# Patient Record
Sex: FEMALE | Race: ASIAN | NOT HISPANIC OR LATINO | Employment: FULL TIME | URBAN - METROPOLITAN AREA
[De-identification: names, ages, dates, MRNs, and addresses within clinical notes are randomized per-mention and may not be internally consistent; named-entity substitution may affect disease eponyms.]

---

## 2021-07-30 ENCOUNTER — HOSPITAL ENCOUNTER (EMERGENCY)
Facility: CLINIC | Age: 31
Discharge: HOME | End: 2021-07-31
Attending: EMERGENCY MEDICINE | Admitting: EMERGENCY MEDICINE
Payer: COMMERCIAL

## 2021-07-30 DIAGNOSIS — O46.90 VAGINAL BLEEDING IN PREGNANCY: ICD-10-CM

## 2021-07-30 LAB
ABO/RH(D): NORMAL
ALBUMIN UR-MCNC: NEGATIVE MG/DL
ANION GAP SERPL CALCULATED.3IONS-SCNC: 3 MMOL/L (ref 3–14)
ANTIBODY SCREEN: NEGATIVE
APPEARANCE UR: CLEAR
B-HCG SERPL-ACNC: 2 IU/L (ref 0–5)
BACTERIA #/AREA URNS HPF: ABNORMAL /HPF
BASOPHILS # BLD AUTO: 0.1 10E3/UL (ref 0–0.2)
BASOPHILS NFR BLD AUTO: 1 %
BILIRUB UR QL STRIP: NEGATIVE
BUN SERPL-MCNC: 9 MG/DL (ref 7–30)
CALCIUM SERPL-MCNC: 9 MG/DL (ref 8.5–10.1)
CHLORIDE BLD-SCNC: 109 MMOL/L (ref 94–109)
CO2 SERPL-SCNC: 28 MMOL/L (ref 20–32)
COLOR UR AUTO: YELLOW
CREAT SERPL-MCNC: 0.68 MG/DL (ref 0.52–1.04)
EOSINOPHIL # BLD AUTO: 0.2 10E3/UL (ref 0–0.7)
EOSINOPHIL NFR BLD AUTO: 2 %
ERYTHROCYTE [DISTWIDTH] IN BLOOD BY AUTOMATED COUNT: 12.4 % (ref 10–15)
GFR SERPL CREATININE-BSD FRML MDRD: >90 ML/MIN/1.73M2
GLUCOSE BLD-MCNC: 103 MG/DL (ref 70–99)
GLUCOSE UR STRIP-MCNC: NEGATIVE MG/DL
HCT VFR BLD AUTO: 40.4 % (ref 35–47)
HGB BLD-MCNC: 13.4 G/DL (ref 11.7–15.7)
HGB UR QL STRIP: ABNORMAL
HOLD SPECIMEN: NORMAL
IMM GRANULOCYTES # BLD: 0 10E3/UL
IMM GRANULOCYTES NFR BLD: 0 %
KETONES UR STRIP-MCNC: NEGATIVE MG/DL
LEUKOCYTE ESTERASE UR QL STRIP: ABNORMAL
LYMPHOCYTES # BLD AUTO: 2.3 10E3/UL (ref 0.8–5.3)
LYMPHOCYTES NFR BLD AUTO: 23 %
MCH RBC QN AUTO: 30.8 PG (ref 26.5–33)
MCHC RBC AUTO-ENTMCNC: 33.2 G/DL (ref 31.5–36.5)
MCV RBC AUTO: 93 FL (ref 78–100)
MONOCYTES # BLD AUTO: 1 10E3/UL (ref 0–1.3)
MONOCYTES NFR BLD AUTO: 10 %
MUCOUS THREADS #/AREA URNS LPF: PRESENT /LPF
NEUTROPHILS # BLD AUTO: 6.5 10E3/UL (ref 1.6–8.3)
NEUTROPHILS NFR BLD AUTO: 64 %
NITRATE UR QL: NEGATIVE
NRBC # BLD AUTO: 0 10E3/UL
NRBC BLD AUTO-RTO: 0 /100
PH UR STRIP: 6 [PH] (ref 5–7)
PLATELET # BLD AUTO: 300 10E3/UL (ref 150–450)
POTASSIUM BLD-SCNC: 4 MMOL/L (ref 3.4–5.3)
RBC # BLD AUTO: 4.35 10E6/UL (ref 3.8–5.2)
RBC URINE: 92 /HPF
SODIUM SERPL-SCNC: 140 MMOL/L (ref 133–144)
SP GR UR STRIP: 1.02 (ref 1–1.03)
SPECIMEN EXPIRATION DATE: NORMAL
SQUAMOUS EPITHELIAL: 1 /HPF
UROBILINOGEN UR STRIP-MCNC: NORMAL MG/DL
WBC # BLD AUTO: 10.2 10E3/UL (ref 4–11)
WBC URINE: 7 /HPF

## 2021-07-30 PROCEDURE — 84702 CHORIONIC GONADOTROPIN TEST: CPT | Performed by: EMERGENCY MEDICINE

## 2021-07-30 PROCEDURE — 81001 URINALYSIS AUTO W/SCOPE: CPT | Performed by: EMERGENCY MEDICINE

## 2021-07-30 PROCEDURE — 80048 BASIC METABOLIC PNL TOTAL CA: CPT | Performed by: EMERGENCY MEDICINE

## 2021-07-30 PROCEDURE — 85025 COMPLETE CBC W/AUTO DIFF WBC: CPT | Performed by: EMERGENCY MEDICINE

## 2021-07-30 PROCEDURE — 86900 BLOOD TYPING SEROLOGIC ABO: CPT | Performed by: EMERGENCY MEDICINE

## 2021-07-30 PROCEDURE — 36415 COLL VENOUS BLD VENIPUNCTURE: CPT | Performed by: EMERGENCY MEDICINE

## 2021-07-30 PROCEDURE — 85004 AUTOMATED DIFF WBC COUNT: CPT | Performed by: EMERGENCY MEDICINE

## 2021-07-30 PROCEDURE — 99284 EMERGENCY DEPT VISIT MOD MDM: CPT | Mod: 25

## 2021-07-31 ENCOUNTER — APPOINTMENT (OUTPATIENT)
Dept: ULTRASOUND IMAGING | Facility: CLINIC | Age: 31
End: 2021-07-31
Attending: EMERGENCY MEDICINE
Payer: COMMERCIAL

## 2021-07-31 VITALS
OXYGEN SATURATION: 100 % | SYSTOLIC BLOOD PRESSURE: 106 MMHG | RESPIRATION RATE: 18 BRPM | HEART RATE: 78 BPM | TEMPERATURE: 97.4 F | DIASTOLIC BLOOD PRESSURE: 66 MMHG

## 2021-07-31 PROCEDURE — 76801 OB US < 14 WKS SINGLE FETUS: CPT

## 2021-07-31 ASSESSMENT — ENCOUNTER SYMPTOMS
CHILLS: 0
ABDOMINAL PAIN: 1
HEMATURIA: 0
BACK PAIN: 1
DYSURIA: 0
FEVER: 0

## 2021-07-31 NOTE — ED PROVIDER NOTES
History   Chief Complaint:  Vaginal Bleeding       HPI   Ruth Persaud is a 31 year old female who presents with vaginal bleeding in pregnancy. The patient states that she is about 6 weeks pregnant identified via home test and clinic blood test earlier this week. She states that her HCG value earlier this week was reported to be 19. This morning, she began to feel dizzy before she began to have vaginal bleeding around 1300 this afternoon. The bleeding has continually gotten heavier and she states she has now soaked at least one pad. She denies any noticeable clots passing. The patient also reports lower abdominal cramping beginning shortly after the bleeding began, as well as lower abdominal pain. She denies dysuria, hematuria, nausea, vomiting, fever, and chills. Her LMP was . The patient has had two miscarriages, one , and two pregnancies carried to term.    Review of Systems   Constitutional: Negative for chills and fever.   Gastrointestinal: Positive for abdominal pain (cramping).   Genitourinary: Positive for vaginal bleeding. Negative for dysuria and hematuria.   Musculoskeletal: Positive for back pain.   All other systems reviewed and are negative.      Allergies:  The patient has no known allergies.     Medications:  The patient is not currently taking any prescribed medications.     Past Medical History:    LGSIL on cytologic smear of cervix  Migraine without aura  Anxiety  Depression  Varicella zoster     Past Surgical History:    [place] teeth extraction      Family History:    Type II diabetes - father  Depression - mother    Social History:  The patient presents to the emergency department with her partner, Ari.  PCP: Clinic, [facility] [place] (Inactive)     Physical Exam     Patient Vitals for the past 24 hrs:   BP Temp Temp src Pulse Resp SpO2   21 -- -- -- -- -- 100 %   21 -- -- -- -- -- 99 %   21 -- -- -- -- -- 100 %   21 -- -- -- -- --  100 %   07/31/21 0125 -- -- -- -- -- 100 %   07/31/21 0120 -- -- -- -- -- 100 %   07/31/21 0115 -- -- -- -- -- 100 %   07/31/21 0100 106/66 -- -- 78 -- 100 %   07/31/21 0050 117/70 -- -- 70 -- 100 %   07/30/21 2025 121/72 97.4  F (36.3  C) Temporal 76 18 100 %       Physical Exam  Constitutional: Alert, attentive  HENT:    Nose: Nose normal.    Mouth/Throat: Oropharynx is clear, mucous membranes are moist   Eyes: EOM are normal.   CV: regular rate and rhythm; no murmurs, rubs or gallups  Chest: Effort normal and breath sounds normal.   GI:  There is no tenderness. No distension. Normal bowel sounds  MSK: Normal range of motion.   Neurological: Alert, attentive  Skin: Skin is warm and dry.      Emergency Department Course   Imaging:  OB  US 1st trimester w transvag  IMPRESSION:   There is no evidence of pregnancy. Ectopic pregnancy is not ruled out.  As read by Radiology.     Laboratory:  CBC: WBC: 10.2, HGB: 13.4, PLT: 300    BMP: Glucose 103 (H), o/w WNL (Creatinine: 0.68)    UA: Blood: large (A), Leukocyte Esterase: small (A), WBC: 7 (H), RBC: 92 (H), Bacteria: few (A), Mucous: Present, o/w Negative    HCG Quantitative Blood: 2    ABO/Rh Type and Screen: AB positive, antibody negative    Emergency Department Course:  Reviewed:  I reviewed the patient's nursing notes, vitals, past medical records, and Care Everywhere.     Assessments:  0106 I performed an exam of the patient and obtained history, as documented above.     0240 I rechecked the patient and discussed the workup results. She is comfortable with discharge to home at this time.    Disposition:  The patient was discharged to home.     Impression & Plan   Medical Decision Making:  This is a 31-year-old female with history of of miscarriage who presents for evaluation spotting and mild bleeding in the context of recent positive pregnancy test.  Based on her LMP, she should be approximately 6 weeks pregnant.  However, hCG quantitative earlier this week was  19.  She has a benign abdominal exam and no significant pain reported.  hCG today is 2, concerning for evolving miscarriage.  Ultrasound shows no intrauterine pregnancy or evidence of ectopic.  Given time course, suspect miscarriage.  Discussed this with the patient and her significant other at the bedside.  Plan OB/GYN follow-up in 2 to 3 days return precautions for pain, bleeding, or any other concerns.    Diagnosis:    ICD-10-CM    1. Vaginal bleeding in pregnancy  O46.90        Discharge Medications:  New Prescriptions    No medications on file       Scribe Disclosure:  Aurora MIDDLETON, am serving as a scribe at 12:41 AM on 7/31/2021 to document services personally performed by Godwin Beasley MD based on my observations and the provider's statements to me.     July 31, 2021   [facility] Emergency Department     Godwin Beasley MD  07/31/21 0544

## 2021-07-31 NOTE — ED TRIAGE NOTES
Pt arrives with c/o vaginal bleeding and cramping that started today. Pt is 6 weeks pregnant. Pt reports she noticed some discharge around 1300 today and then bleeding and cramping started around 1500. Pt has changed 2 pads since 1500. Pt does endorse some dizziness. ABCs intact.

## 2021-07-31 NOTE — DISCHARGE INSTRUCTIONS
Discharge Instructions  Vaginal Bleeding in Pregnancy    Bleeding in early pregnancy can be a sign of a miscarriage or an abnormal pregnancy We may do blood pregnancy tests and ultrasound to try to determine what is causing the bleeding, but sometimes we are unable to tell. If this is the case, it will often require more time, more blood tests, and another ultrasound. In this case, we have concern that you are having a miscarriage due to your ultrasound findings and lower value of your pregnancy test.    Generally, every Emergency Department visit should have a follow-up clinic visit with either a primary or a specialty clinic/provider. Please follow-up as instructed by your emergency provider today.    Return to the Emergency Department if:  You have severe abdominal (belly) or pelvic pain.  You faint, or feel lightheaded or dizzy.   Your bleeding gets much worse.  You pass any tissue--solid material that does not look like a blood clot. If you pass tissue, save it (even if you have to pull it out of the toilet) and put it in a plastic bag or jar and bring it in.    You have a fever of 100.5 F or higher.  If no pregnancy could be seen:  It may be that everything is normal and it is just too early to see the pregnancy. It is also possible that you could have an ectopic pregnancy, which is a pregnancy in an abnormal location, such as in the tube ( tubal pregnancy ). We don t see evidence that this is likely today but we cannot exclude it with certainty until a pregnancy can be seen in the uterus (womb) and an ectopic pregnancy can cause severe internal bleeding or death so follow-up is very important.  You should not be alone, in case you suddenly become very sick.   You should not have sex or put anything in your vagina.     If a pregnancy was seen in your uterus:  If a heartbeat could be seen, the chance of miscarriage is lower but because you had bleeding the chance of a miscarriage still exists.  You should not  have sex or put anything in your vagina.  Follow-up as directed with your OB/GYN provider.     Facts about miscarriage: We hope you do not have a miscarriage, but if you do, here are important things to know:  Early miscarriage is very common, and having one miscarriage does not mean you will have problems with another pregnancy.  Nothing you did caused it. Taking medicine, drinking alcohol, having sex, exercising, or falling down will not cause a miscarriage.     If you were given a prescription for medicine here today, be sure to read all of the information (including the package insert) that comes with your prescription.  This will include important information about the medicine, its side effects, and any warnings that you need to know about.  The pharmacist who fills the prescription can provide more information and answer questions you may have about the medicine.  If you have questions or concerns that the pharmacist cannot address, please call or return to the Emergency Department.   Remember that you can always come back to the Emergency Department if you are not able to see your regular provider in the amount of time listed above, if you get any new symptoms, or if there is anything that worries you.

## 2021-10-02 ENCOUNTER — HEALTH MAINTENANCE LETTER (OUTPATIENT)
Age: 31
End: 2021-10-02

## 2022-02-21 LAB
HEPATITIS B SURFACE ANTIGEN (EXTERNAL): NEGATIVE
HIV1+2 AB SERPL QL IA: NEGATIVE
RUBELLA ANTIBODY IGG (EXTERNAL): NORMAL
TREPONEMA PALLIDUM ANTIBODY (EXTERNAL): NONREACTIVE

## 2022-03-02 ENCOUNTER — HOSPITAL ENCOUNTER (EMERGENCY)
Facility: CLINIC | Age: 32
Discharge: HOME | End: 2022-03-02
Attending: EMERGENCY MEDICINE | Admitting: EMERGENCY MEDICINE
Payer: COMMERCIAL

## 2022-03-02 VITALS
OXYGEN SATURATION: 100 % | BODY MASS INDEX: 26.05 KG/M2 | SYSTOLIC BLOOD PRESSURE: 116 MMHG | HEART RATE: 90 BPM | RESPIRATION RATE: 16 BRPM | DIASTOLIC BLOOD PRESSURE: 66 MMHG | HEIGHT: 63 IN | TEMPERATURE: 97 F | WEIGHT: 147 LBS

## 2022-03-02 DIAGNOSIS — Z3A.09 9 WEEKS GESTATION OF PREGNANCY: ICD-10-CM

## 2022-03-02 DIAGNOSIS — A08.4 VIRAL GASTROENTERITIS: ICD-10-CM

## 2022-03-02 LAB
ANION GAP SERPL CALCULATED.3IONS-SCNC: 5 MMOL/L (ref 3–14)
BUN SERPL-MCNC: 6 MG/DL (ref 7–30)
CALCIUM SERPL-MCNC: 8.7 MG/DL (ref 8.5–10.1)
CHLORIDE BLD-SCNC: 106 MMOL/L (ref 94–109)
CO2 SERPL-SCNC: 27 MMOL/L (ref 20–32)
CREAT SERPL-MCNC: 0.55 MG/DL (ref 0.52–1.04)
ERYTHROCYTE [DISTWIDTH] IN BLOOD BY AUTOMATED COUNT: 12.8 % (ref 10–15)
GFR SERPL CREATININE-BSD FRML MDRD: >90 ML/MIN/1.73M2
GLUCOSE BLD-MCNC: 89 MG/DL (ref 70–99)
HCT VFR BLD AUTO: 38.1 % (ref 35–47)
HGB BLD-MCNC: 12.8 G/DL (ref 11.7–15.7)
HOLD SPECIMEN: NORMAL
MCH RBC QN AUTO: 31.2 PG (ref 26.5–33)
MCHC RBC AUTO-ENTMCNC: 33.6 G/DL (ref 31.5–36.5)
MCV RBC AUTO: 93 FL (ref 78–100)
PLATELET # BLD AUTO: 270 10E3/UL (ref 150–450)
POTASSIUM BLD-SCNC: 3.5 MMOL/L (ref 3.4–5.3)
RBC # BLD AUTO: 4.1 10E6/UL (ref 3.8–5.2)
SODIUM SERPL-SCNC: 138 MMOL/L (ref 133–144)
WBC # BLD AUTO: 15 10E3/UL (ref 4–11)

## 2022-03-02 PROCEDURE — 82310 ASSAY OF CALCIUM: CPT | Performed by: EMERGENCY MEDICINE

## 2022-03-02 PROCEDURE — 250N000013 HC RX MED GY IP 250 OP 250 PS 637: Performed by: EMERGENCY MEDICINE

## 2022-03-02 PROCEDURE — 258N000003 HC RX IP 258 OP 636: Performed by: EMERGENCY MEDICINE

## 2022-03-02 PROCEDURE — 99283 EMERGENCY DEPT VISIT LOW MDM: CPT | Mod: 25

## 2022-03-02 PROCEDURE — 85027 COMPLETE CBC AUTOMATED: CPT | Performed by: EMERGENCY MEDICINE

## 2022-03-02 PROCEDURE — 36415 COLL VENOUS BLD VENIPUNCTURE: CPT | Performed by: EMERGENCY MEDICINE

## 2022-03-02 PROCEDURE — 96360 HYDRATION IV INFUSION INIT: CPT

## 2022-03-02 RX ORDER — ACETAMINOPHEN 500 MG
1000 TABLET ORAL ONCE
Status: COMPLETED | OUTPATIENT
Start: 2022-03-02 | End: 2022-03-02

## 2022-03-02 RX ADMIN — SODIUM CHLORIDE 1000 ML: 9 INJECTION, SOLUTION INTRAVENOUS at 04:04

## 2022-03-02 RX ADMIN — ACETAMINOPHEN 1000 MG: 500 TABLET, FILM COATED ORAL at 04:07

## 2022-03-02 ASSESSMENT — ENCOUNTER SYMPTOMS: ABDOMINAL PAIN: 1

## 2022-03-02 NOTE — ED PROVIDER NOTES
"  History     Chief Complaint:  Abdominal Pain      The history is provided by the patient.      Ruth Persaud is a 31 year old  female who is 9 weeks pregnant and presents with abdominal pain after having diarrhea and vomiting. Approximately 3 days ago vomiting and diarrhea started. The next day her vomiting had stopped but diarrhea persisted. She described the diarrhea as a little bloody and containing mucous. She took loperamide which stopped her diarrhea but she now feels bloated and has abdominal pain. She notes that OBGYN recommended she be seen in the ED. No vaginal bleeding    Review of Systems   Gastrointestinal: Positive for abdominal pain.   Genitourinary: Negative for vaginal bleeding.   All other systems reviewed and are negative.    Allergies:  The patient has no known allergies.     Medications:  The patient is not currently taking any prescribed medications.    Past Medical History:     Varicella  Anxiety and depression  Migraine  Mild hyperemesis gravidarum    Past Surgical History:    [place] teeth extraction     Family History:    Father - type II diabetes  Mother - depression, thyroid disorder    Social History:      Physical Exam     Patient Vitals for the past 24 hrs:   BP Temp Temp src Pulse Resp SpO2 Height Weight   22 0345 116/66 97  F (36.1  C) Temporal 90 16 100 % 1.6 m (5' 3\") 66.7 kg (147 lb)     Physical Exam  Nursing note and vitals reviewed.  Constitutional: Cooperative.   HENT:   Mouth/Throat: Mucous membranes are normal.   Cardiovascular: Normal rate, regular rhythm and normal heart sounds.  No murmur.  Pulmonary/Chest: Effort normal and breath sounds normal. No respiratory distress. No wheezes. No rales.   Abdominal: Soft. Normal appearance and bowel sounds are normal. No distension. There is mild diffuse tenderness. There is no rigidity and no guarding.   Neurological: Alert.   Skin: Skin is warm and dry. Coining bruising noted on chest.   Psychiatric: Normal mood and " affect.     Emergency Department Course     Laboratory:  Labs Ordered and Resulted from Time of ED Arrival to Time of ED Departure   BASIC METABOLIC PANEL - Abnormal       Result Value    Sodium 138      Potassium 3.5      Chloride 106      Carbon Dioxide (CO2) 27      Anion Gap 5      Urea Nitrogen 6 (*)     Creatinine 0.55      Calcium 8.7      Glucose 89      GFR Estimate >90     CBC WITH PLATELETS - Abnormal    WBC Count 15.0 (*)     RBC Count 4.10      Hemoglobin 12.8      Hematocrit 38.1      MCV 93      MCH 31.2      MCHC 33.6      RDW 12.8      Platelet Count 270       Reviewed:  I reviewed nursing notes, vitals, past medical history and Care Everywhere    Assessments:  0348 I obtained history and examined the patient as noted above.   0436 I rechecked the patient and explained findings.     Interventions:  0404 NS bolus 1,000 mL IV  0407 Tylenol 1,000 mg PO    Disposition:  The patient was discharged to home.     Impression & Plan     Medical Decision Making:  Ruth Persaud is a 31 year old female who presents for evaluation of nausea, vomiting and diarrhea with mild abdominal pain in a nonfocal abdominal exam.   I considered a broad differential diagnosis for this patient including viral gastroenteritis, bacterial infection of the large intestine (salmonella, shigella, campylobacter, e coli, etc), bowel obstruction, intra-abdominal infection such as colitis, food poisoning, cholecystitis, UTI, pyelonephritis, appendicitis, etc.  There are no signs of worrisome intra-abdominal pathologies detected during the visit today.  No concerns related to pregnancy with no vaginal bleeding or pain below the umbilicus.  She has a completely benign abdominal exam without rebound, guarding, or marked tenderness to palpation.  Supportive outpatient management is therefore indicated.  Bloating likely due to mild ileus.     Diagnosis:    ICD-10-CM    1. Viral gastroenteritis  A08.4    2. 9 weeks gestation of pregnancy  Z3A.09       Scribe Disclosure:  I, Sonali Levin, am serving as a scribe at 3:49 AM on 3/2/2022 to document services personally performed by Godwin Choudhary MD based on my observations and the provider's statements to me.            Godwin Choudhary MD  03/02/22 0457

## 2022-03-02 NOTE — ED TRIAGE NOTES
Pt is 9w pregnant. Diarrhea and vomitting started Sunday. Vomitting stopped Monday. Pt states diarrhea is mucousy and bloody. OTC antidiarrheal helped, now bloated. Generalized abd pain started at midnight. Pt states OBGYN told her to be seen.

## 2022-08-01 ENCOUNTER — HOSPITAL ENCOUNTER (INPATIENT)
Facility: CLINIC | Age: 32
LOS: 2 days | Discharge: HOME | DRG: 833 | End: 2022-08-03
Attending: STUDENT IN AN ORGANIZED HEALTH CARE EDUCATION/TRAINING PROGRAM | Admitting: STUDENT IN AN ORGANIZED HEALTH CARE EDUCATION/TRAINING PROGRAM
Payer: COMMERCIAL

## 2022-08-01 PROBLEM — Z36.89 ENCOUNTER FOR TRIAGE IN PREGNANT PATIENT: Status: ACTIVE | Noted: 2022-08-01

## 2022-08-01 PROBLEM — O47.00 PRETERM CONTRACTIONS: Status: ACTIVE | Noted: 2022-08-01

## 2022-08-01 LAB
ABO/RH(D): NORMAL
ALBUMIN UR-MCNC: NEGATIVE MG/DL
ANTIBODY SCREEN: NEGATIVE
APPEARANCE UR: CLEAR
BILIRUB UR QL STRIP: NEGATIVE
CLUE CELLS: ABNORMAL
COLOR UR AUTO: NORMAL
ERYTHROCYTE [DISTWIDTH] IN BLOOD BY AUTOMATED COUNT: 13.8 % (ref 10–15)
FFN SPECIMEN INTEGRITY: ABNORMAL
FIBRONECTIN FETAL VAG QL: POSITIVE
GLUCOSE BLDC GLUCOMTR-MCNC: 154 MG/DL (ref 70–99)
GLUCOSE UR STRIP-MCNC: NEGATIVE MG/DL
HCT VFR BLD AUTO: 39.2 % (ref 35–47)
HGB BLD-MCNC: 12.7 G/DL (ref 11.7–15.7)
HGB UR QL STRIP: NEGATIVE
KETONES UR STRIP-MCNC: NEGATIVE MG/DL
LEUKOCYTE ESTERASE UR QL STRIP: NEGATIVE
MCH RBC QN AUTO: 30.8 PG (ref 26.5–33)
MCHC RBC AUTO-ENTMCNC: 32.4 G/DL (ref 31.5–36.5)
MCV RBC AUTO: 95 FL (ref 78–100)
NITRATE UR QL: NEGATIVE
PH UR STRIP: 7 [PH] (ref 5–7)
PLATELET # BLD AUTO: 238 10E3/UL (ref 150–450)
RBC # BLD AUTO: 4.12 10E6/UL (ref 3.8–5.2)
RBC URINE: <1 /HPF
SARS-COV-2 RNA RESP QL NAA+PROBE: NEGATIVE
SP GR UR STRIP: 1 (ref 1–1.03)
SPECIMEN EXPIRATION DATE: NORMAL
SQUAMOUS EPITHELIAL: <1 /HPF
TRICHOMONAS, WET PREP: ABNORMAL
UROBILINOGEN UR STRIP-MCNC: NORMAL MG/DL
WBC # BLD AUTO: 9.5 10E3/UL (ref 4–11)
WBC URINE: <1 /HPF
WBC'S/HIGH POWER FIELD, WET PREP: ABNORMAL
YEAST, WET PREP: ABNORMAL

## 2022-08-01 PROCEDURE — 85027 COMPLETE CBC AUTOMATED: CPT | Performed by: STUDENT IN AN ORGANIZED HEALTH CARE EDUCATION/TRAINING PROGRAM

## 2022-08-01 PROCEDURE — 81001 URINALYSIS AUTO W/SCOPE: CPT | Performed by: STUDENT IN AN ORGANIZED HEALTH CARE EDUCATION/TRAINING PROGRAM

## 2022-08-01 PROCEDURE — 258N000003 HC RX IP 258 OP 636: Performed by: STUDENT IN AN ORGANIZED HEALTH CARE EDUCATION/TRAINING PROGRAM

## 2022-08-01 PROCEDURE — 86901 BLOOD TYPING SEROLOGIC RH(D): CPT | Performed by: STUDENT IN AN ORGANIZED HEALTH CARE EDUCATION/TRAINING PROGRAM

## 2022-08-01 PROCEDURE — 82731 ASSAY OF FETAL FIBRONECTIN: CPT | Performed by: STUDENT IN AN ORGANIZED HEALTH CARE EDUCATION/TRAINING PROGRAM

## 2022-08-01 PROCEDURE — 87653 STREP B DNA AMP PROBE: CPT | Performed by: STUDENT IN AN ORGANIZED HEALTH CARE EDUCATION/TRAINING PROGRAM

## 2022-08-01 PROCEDURE — 86780 TREPONEMA PALLIDUM: CPT | Performed by: STUDENT IN AN ORGANIZED HEALTH CARE EDUCATION/TRAINING PROGRAM

## 2022-08-01 PROCEDURE — U0005 INFEC AGEN DETEC AMPLI PROBE: HCPCS | Performed by: STUDENT IN AN ORGANIZED HEALTH CARE EDUCATION/TRAINING PROGRAM

## 2022-08-01 PROCEDURE — 120N000001 HC R&B MED SURG/OB

## 2022-08-01 PROCEDURE — G0463 HOSPITAL OUTPT CLINIC VISIT: HCPCS

## 2022-08-01 PROCEDURE — 87210 SMEAR WET MOUNT SALINE/INK: CPT | Performed by: STUDENT IN AN ORGANIZED HEALTH CARE EDUCATION/TRAINING PROGRAM

## 2022-08-01 PROCEDURE — 250N000011 HC RX IP 250 OP 636: Performed by: STUDENT IN AN ORGANIZED HEALTH CARE EDUCATION/TRAINING PROGRAM

## 2022-08-01 PROCEDURE — 86850 RBC ANTIBODY SCREEN: CPT | Performed by: STUDENT IN AN ORGANIZED HEALTH CARE EDUCATION/TRAINING PROGRAM

## 2022-08-01 PROCEDURE — 250N000013 HC RX MED GY IP 250 OP 250 PS 637: Performed by: STUDENT IN AN ORGANIZED HEALTH CARE EDUCATION/TRAINING PROGRAM

## 2022-08-01 RX ORDER — LIDOCAINE 40 MG/G
CREAM TOPICAL
Status: DISCONTINUED | OUTPATIENT
Start: 2022-08-01 | End: 2022-08-01 | Stop reason: HOSPADM

## 2022-08-01 RX ORDER — METOCLOPRAMIDE 10 MG/1
10 TABLET ORAL EVERY 6 HOURS PRN
Status: DISCONTINUED | OUTPATIENT
Start: 2022-08-01 | End: 2022-08-03 | Stop reason: HOSPADM

## 2022-08-01 RX ORDER — NIFEDIPINE 10 MG/1
20 CAPSULE ORAL ONCE
Status: COMPLETED | OUTPATIENT
Start: 2022-08-01 | End: 2022-08-01

## 2022-08-01 RX ORDER — PROCHLORPERAZINE MALEATE 10 MG
10 TABLET ORAL EVERY 6 HOURS PRN
Status: DISCONTINUED | OUTPATIENT
Start: 2022-08-01 | End: 2022-08-03 | Stop reason: HOSPADM

## 2022-08-01 RX ORDER — NIFEDIPINE 10 MG/1
20 CAPSULE ORAL EVERY 6 HOURS
Status: DISCONTINUED | OUTPATIENT
Start: 2022-08-01 | End: 2022-08-03

## 2022-08-01 RX ORDER — SODIUM CHLORIDE, SODIUM LACTATE, POTASSIUM CHLORIDE, CALCIUM CHLORIDE 600; 310; 30; 20 MG/100ML; MG/100ML; MG/100ML; MG/100ML
INJECTION, SOLUTION INTRAVENOUS CONTINUOUS
Status: DISCONTINUED | OUTPATIENT
Start: 2022-08-01 | End: 2022-08-02 | Stop reason: CLARIF

## 2022-08-01 RX ORDER — LIDOCAINE 40 MG/G
CREAM TOPICAL
Status: DISCONTINUED | OUTPATIENT
Start: 2022-08-01 | End: 2022-08-03 | Stop reason: HOSPADM

## 2022-08-01 RX ORDER — PRENATAL VIT/IRON FUM/FOLIC AC 27MG-0.8MG
1 TABLET ORAL DAILY
COMMUNITY

## 2022-08-01 RX ORDER — PROCHLORPERAZINE 25 MG
25 SUPPOSITORY, RECTAL RECTAL EVERY 12 HOURS PRN
Status: DISCONTINUED | OUTPATIENT
Start: 2022-08-01 | End: 2022-08-03 | Stop reason: HOSPADM

## 2022-08-01 RX ORDER — HYDROXYZINE HYDROCHLORIDE 50 MG/1
100 TABLET, FILM COATED ORAL
Status: DISCONTINUED | OUTPATIENT
Start: 2022-08-01 | End: 2022-08-03 | Stop reason: HOSPADM

## 2022-08-01 RX ORDER — ONDANSETRON 4 MG/1
4 TABLET, ORALLY DISINTEGRATING ORAL EVERY 6 HOURS PRN
Status: DISCONTINUED | OUTPATIENT
Start: 2022-08-01 | End: 2022-08-03 | Stop reason: HOSPADM

## 2022-08-01 RX ORDER — BETAMETHASONE SODIUM PHOSPHATE AND BETAMETHASONE ACETATE 3; 3 MG/ML; MG/ML
12 INJECTION, SUSPENSION INTRA-ARTICULAR; INTRALESIONAL; INTRAMUSCULAR; SOFT TISSUE EVERY 24 HOURS
Status: COMPLETED | OUTPATIENT
Start: 2022-08-01 | End: 2022-08-02

## 2022-08-01 RX ORDER — METOCLOPRAMIDE HYDROCHLORIDE 5 MG/ML
10 INJECTION INTRAMUSCULAR; INTRAVENOUS EVERY 6 HOURS PRN
Status: DISCONTINUED | OUTPATIENT
Start: 2022-08-01 | End: 2022-08-03 | Stop reason: HOSPADM

## 2022-08-01 RX ORDER — NIFEDIPINE 10 MG/1
20 CAPSULE ORAL EVERY 30 MIN PRN
Status: DISCONTINUED | OUTPATIENT
Start: 2022-08-01 | End: 2022-08-03 | Stop reason: HOSPADM

## 2022-08-01 RX ORDER — ONDANSETRON 2 MG/ML
4 INJECTION INTRAMUSCULAR; INTRAVENOUS EVERY 6 HOURS PRN
Status: DISCONTINUED | OUTPATIENT
Start: 2022-08-01 | End: 2022-08-03 | Stop reason: HOSPADM

## 2022-08-01 RX ORDER — ASCORBIC ACID 100 MG
TABLET,CHEWABLE ORAL
COMMUNITY

## 2022-08-01 RX ADMIN — NIFEDIPINE 20 MG: 10 CAPSULE ORAL at 13:14

## 2022-08-01 RX ADMIN — NIFEDIPINE 20 MG: 10 CAPSULE ORAL at 19:28

## 2022-08-01 RX ADMIN — SODIUM CHLORIDE, POTASSIUM CHLORIDE, SODIUM LACTATE AND CALCIUM CHLORIDE: 600; 310; 30; 20 INJECTION, SOLUTION INTRAVENOUS at 13:06

## 2022-08-01 RX ADMIN — SODIUM CHLORIDE, POTASSIUM CHLORIDE, SODIUM LACTATE AND CALCIUM CHLORIDE: 600; 310; 30; 20 INJECTION, SOLUTION INTRAVENOUS at 19:28

## 2022-08-01 RX ADMIN — BETAMETHASONE SODIUM PHOSPHATE AND BETAMETHASONE ACETATE 12 MG: 3; 3 INJECTION, SUSPENSION INTRA-ARTICULAR; INTRALESIONAL; INTRAMUSCULAR at 13:14

## 2022-08-01 ASSESSMENT — ACTIVITIES OF DAILY LIVING (ADL)
ADLS_ACUITY_SCORE: 35

## 2022-08-01 NOTE — PROGRESS NOTES
[facility]  Labor Progress Note    S:  Patient feeling about the same, occasional cramping ctx.    O:   Patient Vitals for the past 4 hrs:   BP Temp Temp src Resp   22 1625 99/58 98.1  F (36.7  C) Oral 16   22 1424 97/55 -- -- --   22 1314 99/51 -- -- --     SVE: /4, unchanged    FHT: Baseline 140, moderate variability, accelerations present, no decelerations  [place]: 0-2 contractions in 10 minutes    A/P:  Ms. Ruth Persaud is a 32 year old  at 31w1d, here with  contractions.    Labor: - continue LR 125mL/hr while awake, can saline lock overnight   - s/p beta at 1314   - continue nifedipine tocolysis   - low threshold for starting magnesium if ctx become stronger or more frequent   - start PCN for GBS unknown if contractions are stronger or more frequent    - Monitoring: external, continuous   - GBS in process    GDMA1   - continue BG 1hr PP and fasting    FWB: - Category I FHT    Leah Henke, MD [facility]  22 4:59 PM

## 2022-08-01 NOTE — PROVIDER NOTIFICATION
08/01/22 1650   Provider Notification   Provider Name/Title Dr Mayo   Method of Notification In Department   Request Evaluate - Remote   Notification Reason Status Update       MD in department. Notified that pt reports contractions are the same as when MD was at bedside and that she just used the bathroom to empty bladder. Updated that pt verbalizes understanding to call RN if contractions get closer together or stronger, will check in on pt in 30 minutes.

## 2022-08-01 NOTE — PROVIDER NOTIFICATION
08/01/22 1619   Provider Notification   Provider Name/Title Dr Mayo   Method of Notification At Bedside   Request Evaluate in Person     MD at bedside. SVE unchanged from previous exam. MD discussed possibility of starting magnesium if contractions remain strong or become more frequent. RN will reassess at 1645 pt comfort/ pain level and updated MD

## 2022-08-01 NOTE — PROVIDER NOTIFICATION
08/01/22 1315   Provider Notification   Provider Name/Title Dr. Mayo   Method of Notification At Bedside   Request Evaluate in Person   Notification Reason Status Update   MD att bedside to perform ultrasound to confirm vertex, ultrasound confirmed vertex.

## 2022-08-01 NOTE — PROVIDER NOTIFICATION
08/01/22 1055   Provider Notification   Provider Name/Title Dr. Mayo   Method of Notification At Bedside   Request Evaluate in Person   Notification Reason Status Update   MD at bedside to assess patient. FHT's and contraction pattern reviewed. MD updated on intermittent VD and patient blood glucose per patient prior to arrival of 140 1 hour postprandial. Orders received for FFN, wet prep, GBS, and UA. MD performed lab collection and SVE. SVE 2/30/-4, MD discussed IV hydration, patient wishes to oral hydrate at this time.

## 2022-08-01 NOTE — PROVIDER NOTIFICATION
08/01/22 1445   Provider Notification   Provider Name/Title Dr. Mayo   Method of Notification Phone   Request Evaluate - Remote   Notification Reason Status Update   MD updated on FHT's, contraction pattern, BP's, and patient stating pain is unchanged. Orders received to continue to monitor and MD will plan to perform repeat SVE this afternoon.

## 2022-08-01 NOTE — PLAN OF CARE
"Data: Patient presented to [facility]: 2022 10:22 AM.  Reason for maternal/fetal assessment is uterine contractions. Patient reports \"at 0700 I started to having contractions that I would rate a 3/10 every 14-15 minutes.\"  Patient is a .  Prenatal record reviewed. Pregnancy  has been complicated by gestational diabetes, diet controlled and history of  delivery.  Gestational Age 31w1d. VSS. Fetal movement active. Patient denies leaking of vaginal fluid/rupture of membranes, vaginal bleeding, pelvic pressure, nausea, vomiting, headache, visual disturbances, epigastric or URQ pain, significant edema. Support person is not present.   Action: Verbal consent for EFM. Triage assessment completed. Bill of rights reviewed.  Response: Patient verbalized agreement with plan. Will contact Dr Georgia Mayo with update and for further orders.     "

## 2022-08-01 NOTE — LETTER
[facility]  [address]  [facility] [address]  Phone: [phone]  Fax: [phone]    August 3, 2022        Ruth Persaud  [address] [facility] [address]      To whom it may concern:    RE: Ruth Persaud    Patient was hospitalized 8/1/22. Patient may return to work 8/5/22 with the following:  Light duty-unable to lift more than 20 pounds. She will need hydration breaks up to every 2 hours.    Please contact me for questions or concerns.      Sincerely,    Nate Wilkerson MD

## 2022-08-01 NOTE — H&P
"[facility]  H&P    CC: contractions    HPI: Ms. Ruth Persaud is a 32 year old  at 31w1d by 8w1d, who presents with contractions that started around 7am. Feels them about every 15 minutes and having to breathe through some of them. This feels similar to how her contractions started with her other  deliveries. [place] 48 hrs ago but none in the past 24 hrs. Denies vaginal bleeding or abnormal vaginal discharge. Denies fever, chills, dysuria. No known abdominal trauma. + Good fetal movement.    Obstetric Complications  1. GDMA1  2. History of  delivery (35w0d and 36w4d)  3. Covid in 1st trimester  4. History of depression and anxiety     Past Medical History:   Diagnosis Date     Diabetes (H)      Past Surgical History:   Procedure Laterality Date     WISDOM TOOTH EXTRACTION       Social History     Tobacco Use     Smoking status: Never Smoker     Smokeless tobacco: Never Used   Substance Use Topics     Alcohol use: Never     Drug use: Never     History reviewed. No pertinent family history.    O:  Patient Vitals for the past 24 hrs:   BP Temp Temp src Resp Height   22 1045 -- -- -- -- 1.6 m (5' 3\")   22 1038 107/62 98.4  F (36.9  C) Oral 16 --     Gen: Well-appearing, NAD  CV: RRR  Pulm: Breathing comfortably on room air  Abd: Soft, gravid, nontender    Spec: Scant yellow-white discharge, cervix visually 1cm and long  Cervix: 2/30/-4     FHT: , moderate maria teresa, + accels, 1 variable decel  [place]: 0-2 ctx in 10 mins    Labs:  FFN positive  Wet prep neg   GBS in process  UA neg  Covid neg  WBC 9.5  Hgb 12.7  Plt 238    Imaging:   Cephalic fetal presentation by bedside US     A/P:  Ms. Ruth Persaud is a 32 year old  at 31w1d by 8w1d US here with  contractions.     contractions w/ positive FFN and hx of PTD   - LR 125ml/hr  - betamethasone Q24h x2  - nifedipine tocolysis for up to 48 hrs, but hold for BP parameters or dizziness   - if ctx " become stronger or further cervical change start mag sulfate for fetal neuroprotection and PCN for GBS ppx   - continuous fetal monitoring   - NICU consult if ctx become stronger or cervical change     GDMA1  - BG 1hr PP and fasting, notify MD if >140 postprandial or >95 fasting   - discussed that short term use of insulin may be needed due to effect of steroids    History of depression and anxiety   - not currently on meds and feels stable  - no hx of PPD      Prenatal Care:  - OB labs reviewed: AB, Rubella immune, Heb B Ag non-reactive, HIV negative, RPR negative  - Genetics: declined  - Anatomy ultrasound: normal level 2 US except for missed anatomy (recheck at 28 weeks was normal)  - Rh positive, Rhogam not indicated  - GCT  failed, failed 2 hr GTT at 28 weeks along with hgb 11.8, RPR neg  - S/p flu, Tdap 7/11/22  - s/p Covid vaccine   - GBS at 36 weeks   - Feed: planning on pumping  - Contraception: plan on progesterone only pill    #FWB: - Category I FHT    Georgia Mayo MD

## 2022-08-02 ENCOUNTER — APPOINTMENT (OUTPATIENT)
Dept: ULTRASOUND IMAGING | Facility: CLINIC | Age: 32
DRG: 833 | End: 2022-08-02
Attending: OBSTETRICS & GYNECOLOGY
Payer: COMMERCIAL

## 2022-08-02 LAB
GLUCOSE BLDC GLUCOMTR-MCNC: 100 MG/DL (ref 70–99)
GLUCOSE BLDC GLUCOMTR-MCNC: 140 MG/DL (ref 70–99)
GLUCOSE BLDC GLUCOMTR-MCNC: 177 MG/DL (ref 70–99)
GLUCOSE BLDC GLUCOMTR-MCNC: 213 MG/DL (ref 70–99)
GP B STREP DNA SPEC QL NAA+PROBE: NEGATIVE
T PALLIDUM AB SER QL: NONREACTIVE

## 2022-08-02 PROCEDURE — 76816 OB US FOLLOW-UP PER FETUS: CPT

## 2022-08-02 PROCEDURE — 250N000011 HC RX IP 250 OP 636: Performed by: STUDENT IN AN ORGANIZED HEALTH CARE EDUCATION/TRAINING PROGRAM

## 2022-08-02 PROCEDURE — 258N000003 HC RX IP 258 OP 636: Performed by: STUDENT IN AN ORGANIZED HEALTH CARE EDUCATION/TRAINING PROGRAM

## 2022-08-02 PROCEDURE — 76816 OB US FOLLOW-UP PER FETUS: CPT | Mod: 26 | Performed by: OBSTETRICS & GYNECOLOGY

## 2022-08-02 PROCEDURE — 120N000001 HC R&B MED SURG/OB

## 2022-08-02 PROCEDURE — 250N000012 HC RX MED GY IP 250 OP 636 PS 637: Performed by: OBSTETRICS & GYNECOLOGY

## 2022-08-02 PROCEDURE — 76819 FETAL BIOPHYS PROFIL W/O NST: CPT | Mod: 26 | Performed by: OBSTETRICS & GYNECOLOGY

## 2022-08-02 PROCEDURE — 250N000013 HC RX MED GY IP 250 OP 250 PS 637: Performed by: STUDENT IN AN ORGANIZED HEALTH CARE EDUCATION/TRAINING PROGRAM

## 2022-08-02 RX ORDER — DEXTROSE MONOHYDRATE 25 G/50ML
25-50 INJECTION, SOLUTION INTRAVENOUS
Status: DISCONTINUED | OUTPATIENT
Start: 2022-08-02 | End: 2022-08-03 | Stop reason: HOSPADM

## 2022-08-02 RX ORDER — NICOTINE POLACRILEX 4 MG
15-30 LOZENGE BUCCAL
Status: DISCONTINUED | OUTPATIENT
Start: 2022-08-02 | End: 2022-08-03 | Stop reason: HOSPADM

## 2022-08-02 RX ADMIN — SODIUM CHLORIDE, POTASSIUM CHLORIDE, SODIUM LACTATE AND CALCIUM CHLORIDE: 600; 310; 30; 20 INJECTION, SOLUTION INTRAVENOUS at 06:37

## 2022-08-02 RX ADMIN — SODIUM CHLORIDE, POTASSIUM CHLORIDE, SODIUM LACTATE AND CALCIUM CHLORIDE: 600; 310; 30; 20 INJECTION, SOLUTION INTRAVENOUS at 22:40

## 2022-08-02 RX ADMIN — NIFEDIPINE 20 MG: 10 CAPSULE ORAL at 01:03

## 2022-08-02 RX ADMIN — INSULIN ASPART 1 UNITS: 100 INJECTION, SOLUTION INTRAVENOUS; SUBCUTANEOUS at 14:23

## 2022-08-02 RX ADMIN — INSULIN ASPART 1 UNITS: 100 INJECTION, SOLUTION INTRAVENOUS; SUBCUTANEOUS at 10:20

## 2022-08-02 RX ADMIN — NIFEDIPINE 20 MG: 10 CAPSULE ORAL at 18:53

## 2022-08-02 RX ADMIN — SODIUM CHLORIDE, POTASSIUM CHLORIDE, SODIUM LACTATE AND CALCIUM CHLORIDE: 600; 310; 30; 20 INJECTION, SOLUTION INTRAVENOUS at 14:17

## 2022-08-02 RX ADMIN — NIFEDIPINE 20 MG: 10 CAPSULE ORAL at 13:05

## 2022-08-02 RX ADMIN — NIFEDIPINE 20 MG: 10 CAPSULE ORAL at 07:16

## 2022-08-02 RX ADMIN — INSULIN ASPART 2 UNITS: 100 INJECTION, SOLUTION INTRAVENOUS; SUBCUTANEOUS at 19:01

## 2022-08-02 RX ADMIN — BETAMETHASONE SODIUM PHOSPHATE AND BETAMETHASONE ACETATE 12 MG: 3; 3 INJECTION, SUSPENSION INTRA-ARTICULAR; INTRALESIONAL; INTRAMUSCULAR at 13:06

## 2022-08-02 ASSESSMENT — ACTIVITIES OF DAILY LIVING (ADL)
ADLS_ACUITY_SCORE: 35

## 2022-08-02 NOTE — PROVIDER NOTIFICATION
08/01/22 2021   Provider Notification   Provider Name/Title Dr. Mayo   Method of Notification In Department   Request Evaluate - Remote   Notification Reason Status Update   Dr. Mayo updated of 1 hour post meal blood glucose being 154. No new orders at this time.

## 2022-08-02 NOTE — PROGRESS NOTES
[facility]  Labor Progress Note    S: Patient states she is doing OK .  States the frequency and intensity of contractions are only slightly better than yesterday.  Denies any VB or LOF.  Good FM.  Will the US tell EFW?    O:   Patient Vitals for the past 4 hrs:   BP Temp Temp src   08/02/22 0715 109/55 97.8  F (36.6  C) Oral       SVE: Deferred     FHT: Baseline 125, moderate variability, accelerations 15x15, spontaneous  Deceleration at 0838 to candis of 100 with return to baseline after 90 seconds  [place]: Contractions every 2-5 minutes     Labs:   Component      Latest Ref Rng & Units 8/1/2022   Color Urine      Colorless, Straw, Light Yellow, Yellow Straw   Appearance Urine      Clear Clear   Glucose Urine      Negative mg/dL Negative   Bilirubin Urine      Negative Negative   Ketones Urine      Negative mg/dL Negative   Specific Gravity Urine      1.003 - 1.035 1.003   Blood Urine      Negative Negative   pH Urine      5.0 - 7.0 7.0   Protein Albumin Urine      Negative mg/dL Negative   Urobilinogen mg/dL      Normal, 2.0 mg/dL Normal   Nitrite Urine      Negative Negative   Leukocyte Esterase Urine      Negative Negative   RBC Urine      <=2 /HPF <1   WBC Urine      <=5 /HPF <1   Squamous Epithelial /HPF Urine      <=1 /HPF <1   WBC      4.0 - 11.0 10e3/uL 9.5   RBC Count      3.80 - 5.20 10e6/uL 4.12   Hemoglobin      11.7 - 15.7 g/dL 12.7   Hematocrit      35.0 - 47.0 % 39.2   MCV      78 - 100 fL 95   MCH      26.5 - 33.0 pg 30.8   MCHC      31.5 - 36.5 g/dL 32.4   RDW      10.0 - 15.0 % 13.8   Platelet Count      150 - 450 10e3/uL 238   Trichomonas      Absent Absent   Yeast      Absent Absent   Clue cells      Absent Absent   WBCs/high power field      None 2+ (A)   ABO/Rh(D)       AB POS   Antibody Screen      Negative Negative   SPECIMEN EXPIRATION DATE       [id number]   Fetal Fibronectin      Negative Positive (A)   FFN Specimen Integrity       Satisfactory Specimen   SARS CoV2 PCR       Negative Negative   GLUCOSE BY METER POCT      70 - 99 mg/dL 154 (H)     A/P:  Ms. Ruth Persaud is a 32 year old  at 31w2d, here for  contractions and h/o 2 prior PTD at 35 and 36 weeks.     contractions/rule out  Labor:   - H/o of x2 PTD at 35w0d ad 36w4d  - BMZ #1/2 at 1314; repeat this afternoon   - Nifedipine tocolyis through BMZ window, discussed after 48 hours will stop this medication and reassess.  If stable, she may be discharged.  Reviewed importance of pelvic rest and no lifting >20# if discharged home.   - GBS pendings   - Infectious work-up negative as outlined above   - MFM F/u Comprehensive US ordered     GDMA1:   - FBG and 1 hour PP   - Medium sliding scale insulin coverage as needed given anticipated hyperglycemia with BMZ administration     Remote history of ABDULAZIZ/MDD:   - No medications currently   - SW consult placed to assess today given antepartum stay     FWB:   - Category II FHT    PNC:   - S/p flu, Tdap 22  - s/p Covid vaccine   - OB labs: AB positive, Rubella immune, Heb B Ag non-reactive, HIV negative, RPR negative  - Genetics: declined  - Anatomy ultrasound: normal level 2 US except for missed anatomy (recheck at 28 weeks was normal)  - Placenta posterior     Inpatient until through BMZ window.     Michelle Alvarez MD   Pager: [phone]   2022

## 2022-08-02 NOTE — PLAN OF CARE
Dr. Alvarez in department and updated of am blood glucose. She put sliding scale orders in. Patient was updated of the plan.  Contraction that showed up to monitor compared to what patient reported showed some differences. She was able to sleep most of the night. Was up between 0300 and 0415- reporting contractions every 7-14 minutes apart. Had patient jennifer her contractions from 0640 to 0720. Reports some contractions are stronger then others but not increased or more painful since admission.

## 2022-08-03 VITALS
OXYGEN SATURATION: 99 % | BODY MASS INDEX: 26.04 KG/M2 | RESPIRATION RATE: 16 BRPM | TEMPERATURE: 98.2 F | DIASTOLIC BLOOD PRESSURE: 58 MMHG | HEIGHT: 63 IN | SYSTOLIC BLOOD PRESSURE: 117 MMHG

## 2022-08-03 LAB
GLUCOSE BLDC GLUCOMTR-MCNC: 132 MG/DL (ref 70–99)
GLUCOSE BLDC GLUCOMTR-MCNC: 152 MG/DL (ref 70–99)
GLUCOSE BLDC GLUCOMTR-MCNC: 172 MG/DL (ref 70–99)

## 2022-08-03 PROCEDURE — 250N000013 HC RX MED GY IP 250 OP 250 PS 637: Performed by: STUDENT IN AN ORGANIZED HEALTH CARE EDUCATION/TRAINING PROGRAM

## 2022-08-03 RX ADMIN — NIFEDIPINE 20 MG: 10 CAPSULE ORAL at 01:00

## 2022-08-03 RX ADMIN — NIFEDIPINE 20 MG: 10 CAPSULE ORAL at 07:03

## 2022-08-03 RX ADMIN — INSULIN ASPART 1 UNITS: 100 INJECTION, SOLUTION INTRAVENOUS; SUBCUTANEOUS at 13:13

## 2022-08-03 RX ADMIN — INSULIN ASPART 1 UNITS: 100 INJECTION, SOLUTION INTRAVENOUS; SUBCUTANEOUS at 09:15

## 2022-08-03 ASSESSMENT — ACTIVITIES OF DAILY LIVING (ADL)
ADLS_ACUITY_SCORE: 35

## 2022-08-03 NOTE — PROGRESS NOTES
[facility]  Labor Progress Note    S: Patient states she is doing OK. Told RN this AM that o/n ctx not regular nor painful. Per RN mild ctx palpated this AM. Last procardia dose 0700. Denies VB, LOF. Endorses good FM.    She requests work note for days in hospital.     O:   Patient Vitals for the past 4 hrs:   BP Temp Temp src Resp   08/03/22 0656 97/55 97.8  F (36.6  C) Oral 18       SVE: Deferred     FHT: 110-160/mod/+a's/-d, cat 1, rxn AM NST  [place]: Contractions every infrequent    Labs:   Component      Latest Ref Rng & Units 8/1/2022   Color Urine      Colorless, Straw, Light Yellow, Yellow Straw   Appearance Urine      Clear Clear   Glucose Urine      Negative mg/dL Negative   Bilirubin Urine      Negative Negative   Ketones Urine      Negative mg/dL Negative   Specific Gravity Urine      1.003 - 1.035 1.003   Blood Urine      Negative Negative   pH Urine      5.0 - 7.0 7.0   Protein Albumin Urine      Negative mg/dL Negative   Urobilinogen mg/dL      Normal, 2.0 mg/dL Normal   Nitrite Urine      Negative Negative   Leukocyte Esterase Urine      Negative Negative   RBC Urine      <=2 /HPF <1   WBC Urine      <=5 /HPF <1   Squamous Epithelial /HPF Urine      <=1 /HPF <1   WBC      4.0 - 11.0 10e3/uL 9.5   RBC Count      3.80 - 5.20 10e6/uL 4.12   Hemoglobin      11.7 - 15.7 g/dL 12.7   Hematocrit      35.0 - 47.0 % 39.2   MCV      78 - 100 fL 95   MCH      26.5 - 33.0 pg 30.8   MCHC      31.5 - 36.5 g/dL 32.4   RDW      10.0 - 15.0 % 13.8   Platelet Count      150 - 450 10e3/uL 238   Trichomonas      Absent Absent   Yeast      Absent Absent   Clue cells      Absent Absent   WBCs/high power field      None 2+ (A)   ABO/Rh(D)       AB POS   Antibody Screen      Negative Negative   SPECIMEN EXPIRATION DATE       [id number]   Fetal Fibronectin      Negative Positive (A)   FFN Specimen Integrity       Satisfactory Specimen   SARS CoV2 PCR      Negative Negative   GLUCOSE BY METER POCT      70 - 99  mg/dL 154 (H)     A/P:  Ms. Ruth Persaud is a 32 year old  at 31w3d, here for  contractions and h/o 2 prior PTD at 35 and 36 weeks.     contractions/rule out  Labor:   - H/o of x2 PTD at 35w0d ad 36w4d  - BMZ #2/2 at 13:06 on   - Nifedipine tocolyis through BMZ window, discussed after 48 hours will stop this medication and reassess.  If stable, she may be discharged.  Reviewed importance of pelvic rest and no lifting >20# if discharged home.   - GBS negative  - Infectious work-up negative as outlined above   - MFM F/u Comprehensive US completed : cephalic, SHAVON wnl, CL 37mm, BPP , growth wnl   + Rec f/u US in 3 weeks for growth    GDMA1:   - FBG and 1 hour PP   - Medium sliding scale insulin coverage as needed given anticipated hyperglycemia with BMZ administration   - 1u today  - Home sugar checks only    Remote history of ABDULAZIZ/MDD:   - No medications currently   - SW consult placed to assess today given antepartum stay     FWB:   - Category I FHT, RXN this AM.    PNC:   - S/p flu, Tdap 22  - s/p Covid vaccine   - OB labs: AB positive, Rubella immune, Heb B Ag non-reactive, HIV negative, RPR negative  - Genetics: declined  - Anatomy ultrasound: normal level 2 US except for missed anatomy (recheck at 28 weeks was normal)  - Placenta posterior     Dispo: Home this afternoon/evening around 4-5pm, if PTC does not significantly return off tocolysis now s/p BMZ course. Work note provided for -. Will try return to work Friday with restrictions stated and f/u in office next week for next visit. Had PN US tomorrow. Pelvic rest re-discussed moving forward.

## 2022-08-03 NOTE — PLAN OF CARE
Data: Patient assessed in the [facility] for uterine contractions.  Cervical exam dilated to 2cm.  Membranes intact.  Contractions/uterine assessment unchanged from today and not changing cervix, MD aware  Action:  Presumed adequate fetal oxygenation documented (see flow record). Discharge instructions reviewed.  Patient instructed to report change in fetal movement, vaginal leaking of fluid or bleeding, abdominal pain, or any concerns related to the pregnancy to her nurse/physician.    Response: Orders to discharge home per Michelle Rizvi.  Patient verbalized understanding of education and verbalized agreement with plan. Discharged to home at 1605.

## 2022-08-03 NOTE — DISCHARGE SUMMARY
[facility]    Discharge Summary  Obstetrics    Date of Admission:  2022  Date of Discharge:  22  Discharging Provider: Nate Wilkerson MD    Discharge Diagnoses   Patient Active Problem List   Diagnosis     Encounter for triage in pregnant patient      contractions   gDMA1    History of Present Illness   Ruth Persaud is a 32 year old female now  who presented to L&D @ 31w1d with PTC. Her pregnancy has been complicated by gDMA1 and Hx of PTD x2 at 35 and 36 wks. Please see her admit H&P for full details of her PMH, PSH, Meds, Allergies and exam on admit.    Hospital Course   Patient started on procardia for tocolysis and completed BMZ x2 doses  and . Her final cervical check remained unchanged: 4. 48hrs after start of BMZ her procardia was held and patient had no further PTC for several hours leading up to discharge. She required one time 1u of insulin while inpatient due to BMZ. She has f/u with endocrine this Friday. She had [facility] US on  which showed: cephalic, BPP 8/8, CL 37mm, growth wnl, SHAVON wnl. Recommended f/u with PN. Patient has had LVL 2 US and repeat US at 28 wks to complete anatomy scan. She reports she has another scan this week. She reports her next OB visit is next  Monday. Prior to discharge her infectious w/u was negative. Her GBS on 22 resulted and was negative, next check would be after 5 weeks (22). Her NSTs on 8/3/22 x2 were Cat 1 RXN.    Hgb:   Lab Results   Component Value Date    HGB 12.7 2022    HGB 12.8 2022    HGB 15.1 2013    HGB 14.0 2011       Lab Results   Component Value Date    RH  Pos 2011    and rhogam was not given    Instructions:  1) Call for temperature greater than 100.4F, foul smelling vaginal discharge, bleeding more than 1 pad per hour for 2 hrs, pain not controlled by oral pain meds, severe constipation or severe nausea or vomiting.  2) Call if hemorrhage, LOF, PTC, Increased  pelvic pressure, decreased FM.  3) Keep your next OB, US, Endocrine visits.  4) Keep checking your sugars 4x/day and recording them.    Discharge Disposition   Discharged to home   Condition at discharge: Stable    Primary Care Physician   [facility]    Consultations This Hospital Stay   None    Discharge Orders      Activity    Activity as tolerated     Reason for your hospital stay    Maternity care     Follow Up    Keep your next scheduled OB visit and your next scheduled ultrasound visit.     Activity (specify)    Pelvic rest no sex.     See your work note letter for directions on attempting return to work this Friday, 8/5/22.     Diet    Resume previous diet     Discharge Medications   Current Discharge Medication List      CONTINUE these medications which have NOT CHANGED    Details   Ascorbic Acid (VITAMIN C) 100 MG CHEW       Prenatal Vit-Fe Fumarate-FA (PRENATAL MULTIVITAMIN W/IRON) 27-0.8 MG tablet Take 1 tablet by mouth daily      vitamin D3 (CHOLECALCIFEROL) 1.25 MG (74030 UT) capsule Take 50,000 Units by mouth every 7 days           Allergies   No Known Allergies

## 2022-08-03 NOTE — PLAN OF CARE
Patient reported resting well overnight. Patient had reported occasional contractions but nothing regular or painful. FHT moderate variability with accelerations and no decelerations. Reactive NST. 1 contraction noted during monitoring, mild with palpation. Patient denies any pain. IV saline locked and flushed. VSS. Nifedipine given x 2 tonight (0100, 0700). Fasting blood sugar 132, no insulin given.

## 2022-08-03 NOTE — PROVIDER NOTIFICATION
08/03/22 1545   Provider Notification   Provider Name/Title Dr Wilkerson   Method of Notification At Bedside   Request Evaluate in Person   Notification Reason Patient Request       MD at bedside. Pt concerns addressed and questions answered. Discharge orders received, pt comfortable with follow up plan.

## 2022-08-03 NOTE — DISCHARGE INSTRUCTIONS
Discharge Instruction for Undelivered Patients      You were seen for: Labor Assessment  We Consulted: Dr. Mayo, Dr. Wilkerson,   You had (Test or Medicine): fetal and uterine monitoring, nonstress tests, betamethasone, nifedipine, vital signs, SVE     Diet:   Drink 8 to 12 glasses of liquids (milk, juice, water) every day.  You may eat meals and snacks.     Activity:  Rest the pelvic area. No sex. Do not stimulate breasts or nipples.  Count fetal kicks everyday (see handout)  Call your doctor or nurse midwife if your baby is moving less than usual.     Call your provider if you notice:  Swelling in your face or increased swelling in your hands or legs.  Headaches that are not relieved by Tylenol (acetaminophen).  Changes in your vision (blurring: seeing spots or stars.)  Nausea (sick to your stomach) and vomiting (throwing up).   Weight gain of 5 pounds or more per week.  Heartburn that doesn't go away.  Signs of bladder infection: pain when you urinate (use the toilet), need to go more often and more urgently.  The bag of boyd (rupture of membranes) breaks, or you notice leaking in your underwear.  Bright red blood in your underwear.  Abdominal (lower belly) or stomach pain.  For first baby: Contractions (tightening) less than 5 minutes apart for one hour or more.  Second (plus) baby: Contractions (tightening) less than 10 minutes apart and getting stronger.  *If less than 34 weeks: Contractions (tightening) more than 6 times in one hour.  Increase or change in vaginal discharge (note the color and amount)  Other: Call your doctor or midwife with any questions or concerns.     Follow-up:  As scheduled in the clinic            Pelvic rest.  Continue to check you blood sugars. Keep your next diabetes appointment.  Follow work letter instructions.

## 2022-08-03 NOTE — PROVIDER NOTIFICATION
"   22 9437   Provider Notification   Provider Name/Title Dr. Wilkerson   Method of Notification Phone   Request Evaluate in Person   Notification Reason Patient Request     MD updated on patient's concerns about discharge.  Patient stated that the last time this happened she ended up back at [employer] the same night she was discharged and was admitted for three days.  After discharge she was sent home on bedrest and procardia which she says \"got me to 35 weeks.\"  The patient is worried that going back to work, even with restrictions and hydration breaks every 2 hours, with no prescription for procardia she will end up back at the hospital in  labor.  MD will be to the unit before discharge to address her concerns.  SVE: 4. Will update as necessary.  "

## 2022-08-03 NOTE — PROVIDER NOTIFICATION
08/02/22 9313   Provider Notification   Provider Name/Title Dr. Alvarez   Method of Notification Electronic Page;Phone   Request Evaluate - Remote   Notification Reason Other (Comment)     Call placed to Dr. Laureano to clarify some orders. Orders for fetal and contractions monitoring every 6 hours unless reports increase in CTX and to Saline lock patient.

## 2022-08-20 ENCOUNTER — TRANSFERRED RECORDS (OUTPATIENT)
Dept: ADMINISTRATIVE | Facility: CLINIC | Age: 32
End: 2022-08-20

## 2022-08-20 ENCOUNTER — HOSPITAL ENCOUNTER (INPATIENT)
Facility: CLINIC | Age: 32
LOS: 2 days | Discharge: HOME | DRG: 833 | End: 2022-08-22
Attending: OBSTETRICS & GYNECOLOGY | Admitting: OBSTETRICS & GYNECOLOGY
Payer: COMMERCIAL

## 2022-08-20 PROBLEM — O60.00 PRETERM LABOR: Status: ACTIVE | Noted: 2022-08-20

## 2022-08-20 LAB
ABO/RH(D): NORMAL
ALBUMIN UR-MCNC: NEGATIVE MG/DL
ANTIBODY SCREEN: NEGATIVE
APPEARANCE UR: CLEAR
BILIRUB UR QL STRIP: NEGATIVE
CLUE CELLS: ABNORMAL
COLOR UR AUTO: ABNORMAL
GLUCOSE BLDC GLUCOMTR-MCNC: 101 MG/DL (ref 70–99)
GLUCOSE UR STRIP-MCNC: NEGATIVE MG/DL
HGB BLD-MCNC: 12.4 G/DL (ref 11.7–15.7)
HGB UR QL STRIP: NEGATIVE
KETONES UR STRIP-MCNC: NEGATIVE MG/DL
LEUKOCYTE ESTERASE UR QL STRIP: ABNORMAL
MUCOUS THREADS #/AREA URNS LPF: PRESENT /LPF
NITRATE UR QL: NEGATIVE
PH UR STRIP: 6.5 [PH] (ref 5–7)
RBC URINE: 1 /HPF
SARS-COV-2 RNA RESP QL NAA+PROBE: NEGATIVE
SP GR UR STRIP: 1.01 (ref 1–1.03)
SPECIMEN EXPIRATION DATE: NORMAL
SQUAMOUS EPITHELIAL: 6 /HPF
TRICHOMONAS, WET PREP: ABNORMAL
UROBILINOGEN UR STRIP-MCNC: NORMAL MG/DL
WBC URINE: 4 /HPF
WBC'S/HIGH POWER FIELD, WET PREP: ABNORMAL
YEAST, WET PREP: ABNORMAL

## 2022-08-20 PROCEDURE — 250N000013 HC RX MED GY IP 250 OP 250 PS 637

## 2022-08-20 PROCEDURE — 99232 SBSQ HOSP IP/OBS MODERATE 35: CPT | Performed by: NURSE PRACTITIONER

## 2022-08-20 PROCEDURE — 86780 TREPONEMA PALLIDUM: CPT | Performed by: OBSTETRICS & GYNECOLOGY

## 2022-08-20 PROCEDURE — 81001 URINALYSIS AUTO W/SCOPE: CPT | Performed by: OBSTETRICS & GYNECOLOGY

## 2022-08-20 PROCEDURE — 85018 HEMOGLOBIN: CPT | Performed by: OBSTETRICS & GYNECOLOGY

## 2022-08-20 PROCEDURE — 86923 COMPATIBILITY TEST ELECTRIC: CPT | Performed by: OBSTETRICS & GYNECOLOGY

## 2022-08-20 PROCEDURE — 87210 SMEAR WET MOUNT SALINE/INK: CPT | Performed by: OBSTETRICS & GYNECOLOGY

## 2022-08-20 PROCEDURE — 86850 RBC ANTIBODY SCREEN: CPT | Performed by: OBSTETRICS & GYNECOLOGY

## 2022-08-20 PROCEDURE — 250N000011 HC RX IP 250 OP 636: Performed by: OBSTETRICS & GYNECOLOGY

## 2022-08-20 PROCEDURE — U0003 INFECTIOUS AGENT DETECTION BY NUCLEIC ACID (DNA OR RNA); SEVERE ACUTE RESPIRATORY SYNDROME CORONAVIRUS 2 (SARS-COV-2) (CORONAVIRUS DISEASE [COVID-19]), AMPLIFIED PROBE TECHNIQUE, MAKING USE OF HIGH THROUGHPUT TECHNOLOGIES AS DESCRIBED BY CMS-2020-01-R: HCPCS | Performed by: OBSTETRICS & GYNECOLOGY

## 2022-08-20 PROCEDURE — 120N000001 HC R&B MED SURG/OB

## 2022-08-20 PROCEDURE — 258N000003 HC RX IP 258 OP 636: Performed by: OBSTETRICS & GYNECOLOGY

## 2022-08-20 PROCEDURE — 86901 BLOOD TYPING SEROLOGIC RH(D): CPT | Performed by: OBSTETRICS & GYNECOLOGY

## 2022-08-20 RX ORDER — CITRIC ACID/SODIUM CITRATE 334-500MG
30 SOLUTION, ORAL ORAL
Status: DISCONTINUED | OUTPATIENT
Start: 2022-08-20 | End: 2022-08-22 | Stop reason: HOSPADM

## 2022-08-20 RX ORDER — ONDANSETRON 4 MG/1
4 TABLET, ORALLY DISINTEGRATING ORAL EVERY 6 HOURS PRN
Status: DISCONTINUED | OUTPATIENT
Start: 2022-08-20 | End: 2022-08-22 | Stop reason: HOSPADM

## 2022-08-20 RX ORDER — NALOXONE HYDROCHLORIDE 0.4 MG/ML
0.2 INJECTION, SOLUTION INTRAMUSCULAR; INTRAVENOUS; SUBCUTANEOUS
Status: DISCONTINUED | OUTPATIENT
Start: 2022-08-20 | End: 2022-08-22 | Stop reason: HOSPADM

## 2022-08-20 RX ORDER — NIFEDIPINE 10 MG/1
CAPSULE ORAL
Status: COMPLETED
Start: 2022-08-20 | End: 2022-08-20

## 2022-08-20 RX ORDER — OXYTOCIN 10 [USP'U]/ML
10 INJECTION, SOLUTION INTRAMUSCULAR; INTRAVENOUS
Status: DISCONTINUED | OUTPATIENT
Start: 2022-08-20 | End: 2022-08-22 | Stop reason: HOSPADM

## 2022-08-20 RX ORDER — METOCLOPRAMIDE 10 MG/1
10 TABLET ORAL EVERY 6 HOURS PRN
Status: DISCONTINUED | OUTPATIENT
Start: 2022-08-20 | End: 2022-08-22 | Stop reason: HOSPADM

## 2022-08-20 RX ORDER — KETOROLAC TROMETHAMINE 30 MG/ML
30 INJECTION, SOLUTION INTRAMUSCULAR; INTRAVENOUS
Status: DISCONTINUED | OUTPATIENT
Start: 2022-08-20 | End: 2022-08-22 | Stop reason: HOSPADM

## 2022-08-20 RX ORDER — MISOPROSTOL 200 UG/1
800 TABLET ORAL
Status: DISCONTINUED | OUTPATIENT
Start: 2022-08-20 | End: 2022-08-22 | Stop reason: HOSPADM

## 2022-08-20 RX ORDER — MISOPROSTOL 200 UG/1
400 TABLET ORAL
Status: DISCONTINUED | OUTPATIENT
Start: 2022-08-20 | End: 2022-08-22 | Stop reason: HOSPADM

## 2022-08-20 RX ORDER — PROCHLORPERAZINE 25 MG
25 SUPPOSITORY, RECTAL RECTAL EVERY 12 HOURS PRN
Status: DISCONTINUED | OUTPATIENT
Start: 2022-08-20 | End: 2022-08-22 | Stop reason: HOSPADM

## 2022-08-20 RX ORDER — IBUPROFEN 800 MG/1
800 TABLET, FILM COATED ORAL
Status: DISCONTINUED | OUTPATIENT
Start: 2022-08-20 | End: 2022-08-22 | Stop reason: HOSPADM

## 2022-08-20 RX ORDER — CARBOPROST TROMETHAMINE 250 UG/ML
250 INJECTION, SOLUTION INTRAMUSCULAR
Status: DISCONTINUED | OUTPATIENT
Start: 2022-08-20 | End: 2022-08-22 | Stop reason: HOSPADM

## 2022-08-20 RX ORDER — TRANEXAMIC ACID 10 MG/ML
1 INJECTION, SOLUTION INTRAVENOUS EVERY 30 MIN PRN
Status: DISCONTINUED | OUTPATIENT
Start: 2022-08-20 | End: 2022-08-22 | Stop reason: HOSPADM

## 2022-08-20 RX ORDER — OXYTOCIN/0.9 % SODIUM CHLORIDE 30/500 ML
340 PLASTIC BAG, INJECTION (ML) INTRAVENOUS CONTINUOUS PRN
Status: DISCONTINUED | OUTPATIENT
Start: 2022-08-20 | End: 2022-08-22 | Stop reason: HOSPADM

## 2022-08-20 RX ORDER — PROCHLORPERAZINE MALEATE 10 MG
10 TABLET ORAL EVERY 6 HOURS PRN
Status: DISCONTINUED | OUTPATIENT
Start: 2022-08-20 | End: 2022-08-22 | Stop reason: HOSPADM

## 2022-08-20 RX ORDER — NALOXONE HYDROCHLORIDE 0.4 MG/ML
0.4 INJECTION, SOLUTION INTRAMUSCULAR; INTRAVENOUS; SUBCUTANEOUS
Status: DISCONTINUED | OUTPATIENT
Start: 2022-08-20 | End: 2022-08-22 | Stop reason: HOSPADM

## 2022-08-20 RX ORDER — NIFEDIPINE 10 MG/1
20 CAPSULE ORAL ONCE
Status: COMPLETED | OUTPATIENT
Start: 2022-08-20 | End: 2022-08-20

## 2022-08-20 RX ORDER — CITALOPRAM HYDROBROMIDE 10 MG/1
10 TABLET ORAL DAILY
COMMUNITY

## 2022-08-20 RX ORDER — NIFEDIPINE 10 MG/1
20 CAPSULE ORAL EVERY 6 HOURS SCHEDULED
Status: DISCONTINUED | OUTPATIENT
Start: 2022-08-21 | End: 2022-08-22 | Stop reason: HOSPADM

## 2022-08-20 RX ORDER — ONDANSETRON 2 MG/ML
4 INJECTION INTRAMUSCULAR; INTRAVENOUS EVERY 6 HOURS PRN
Status: DISCONTINUED | OUTPATIENT
Start: 2022-08-20 | End: 2022-08-22 | Stop reason: HOSPADM

## 2022-08-20 RX ORDER — OXYTOCIN/0.9 % SODIUM CHLORIDE 30/500 ML
100-340 PLASTIC BAG, INJECTION (ML) INTRAVENOUS CONTINUOUS PRN
Status: DISCONTINUED | OUTPATIENT
Start: 2022-08-20 | End: 2022-08-22 | Stop reason: HOSPADM

## 2022-08-20 RX ORDER — METOCLOPRAMIDE HYDROCHLORIDE 5 MG/ML
10 INJECTION INTRAMUSCULAR; INTRAVENOUS EVERY 6 HOURS PRN
Status: DISCONTINUED | OUTPATIENT
Start: 2022-08-20 | End: 2022-08-22 | Stop reason: HOSPADM

## 2022-08-20 RX ORDER — METHYLERGONOVINE MALEATE 0.2 MG/ML
200 INJECTION INTRAVENOUS
Status: DISCONTINUED | OUTPATIENT
Start: 2022-08-20 | End: 2022-08-22 | Stop reason: HOSPADM

## 2022-08-20 RX ORDER — BETAMETHASONE SODIUM PHOSPHATE AND BETAMETHASONE ACETATE 3; 3 MG/ML; MG/ML
12 INJECTION, SUSPENSION INTRA-ARTICULAR; INTRALESIONAL; INTRAMUSCULAR; SOFT TISSUE EVERY 24 HOURS
Status: COMPLETED | OUTPATIENT
Start: 2022-08-20 | End: 2022-08-20

## 2022-08-20 RX ORDER — SODIUM CHLORIDE, SODIUM LACTATE, POTASSIUM CHLORIDE, CALCIUM CHLORIDE 600; 310; 30; 20 MG/100ML; MG/100ML; MG/100ML; MG/100ML
INJECTION, SOLUTION INTRAVENOUS CONTINUOUS
Status: DISCONTINUED | OUTPATIENT
Start: 2022-08-20 | End: 2022-08-22 | Stop reason: HOSPADM

## 2022-08-20 RX ORDER — LIDOCAINE 40 MG/G
CREAM TOPICAL
Status: DISCONTINUED | OUTPATIENT
Start: 2022-08-20 | End: 2022-08-20 | Stop reason: HOSPADM

## 2022-08-20 RX ADMIN — BETAMETHASONE SODIUM PHOSPHATE AND BETAMETHASONE ACETATE 12 MG: 3; 3 INJECTION, SUSPENSION INTRA-ARTICULAR; INTRALESIONAL; INTRAMUSCULAR at 19:46

## 2022-08-20 RX ADMIN — NIFEDIPINE 20 MG: 10 CAPSULE ORAL at 19:14

## 2022-08-20 RX ADMIN — SODIUM CHLORIDE, POTASSIUM CHLORIDE, SODIUM LACTATE AND CALCIUM CHLORIDE 1000 ML: 600; 310; 30; 20 INJECTION, SOLUTION INTRAVENOUS at 19:00

## 2022-08-20 ASSESSMENT — ACTIVITIES OF DAILY LIVING (ADL)
ADLS_ACUITY_SCORE: 20
CONCENTRATING,_REMEMBERING_OR_MAKING_DECISIONS_DIFFICULTY: NO
VISION_MANAGEMENT: GLASSES
WEAR_GLASSES_OR_BLIND: YES
ADLS_ACUITY_SCORE: 20
DOING_ERRANDS_INDEPENDENTLY_DIFFICULTY: NO
ADLS_ACUITY_SCORE: 31
DIFFICULTY_EATING/SWALLOWING: NO
TOILETING_ISSUES: NO
WALKING_OR_CLIMBING_STAIRS_DIFFICULTY: NO
CHANGE_IN_FUNCTIONAL_STATUS_SINCE_ONSET_OF_CURRENT_ILLNESS/INJURY: NO
FALL_HISTORY_WITHIN_LAST_SIX_MONTHS: NO
DRESSING/BATHING_DIFFICULTY: NO

## 2022-08-20 NOTE — PLAN OF CARE
Data: [facility]  Reason for maternal/fetal assessment per patient is patient complaint of contractions. Patient reports ctx starting at 1400 and lower abdominal pain that started this morning.  Patient is a .  Prenatal record reviewed. Pregnancy has been complicated by GDM diet controlled.  Gestational Age 33.6. VSS. Fetal movement present. Patient denies contractions, backache, abnormal vaginal discharge, pelvic pressure, UTI symptoms, GI problems, bloody show, vaginal bleeding, edema, headache, visual disturbances, epigastric or URQ pain, abdominal pain, rupture of membranes. Support person present.   Action: Verbal consent for EFM. Triage assessment completed. Bill of rights reviewed.    Response: Patient verbalized agreement with plan. Patient transferred to room MAC1 ambulatory, oriented to room and call light.  Will contact MD for plan.

## 2022-08-20 NOTE — PLAN OF CARE
Dr. Crump called after being paged regarding patient's arrival. Provider updated on the following: patient's complaint of  contractions that started around 1400 this afternoon, lower abdominal pain that started this morning, no UTI or vaginosis symptoms. Orders to send FFN, UA, Wet prep, and do a SVE. Will contact provider with results.

## 2022-08-20 NOTE — LETTER
[facility]  [address]  [facility] [address]  [phone]    RE:  Ruth Persaud                                                                                                                                                       [address]  [facility] [address]            To whom it may concern:    Ruth Persaud is under my professional care for high risk pregnancy with  contractions. She requires to be off of work from 2022 through 2022.      Please do not hesitate to contact us with questions or concerns.      Sincerely,             Kellen Stafford MD on 2022 at 10:45 AM

## 2022-08-21 LAB
BLD PROD TYP BPU: NORMAL
BLOOD COMPONENT TYPE: NORMAL
CODING SYSTEM: NORMAL
CROSSMATCH: NORMAL
GLUCOSE BLDC GLUCOMTR-MCNC: 116 MG/DL (ref 70–99)
GLUCOSE BLDC GLUCOMTR-MCNC: 123 MG/DL (ref 70–99)
GLUCOSE BLDC GLUCOMTR-MCNC: 125 MG/DL (ref 70–99)
GLUCOSE BLDC GLUCOMTR-MCNC: 132 MG/DL (ref 70–99)
T PALLIDUM AB SER QL: NONREACTIVE
UNIT ABO/RH: NORMAL
UNIT ABO/RH: NORMAL
UNIT NUMBER: NORMAL
UNIT STATUS: NORMAL
UNIT TYPE ISBT: 8400
UNIT TYPE ISBT: 8400

## 2022-08-21 PROCEDURE — 250N000013 HC RX MED GY IP 250 OP 250 PS 637: Performed by: OBSTETRICS & GYNECOLOGY

## 2022-08-21 PROCEDURE — 250N000011 HC RX IP 250 OP 636: Performed by: OBSTETRICS & GYNECOLOGY

## 2022-08-21 PROCEDURE — 99207 PR NO CHARGE LOS: CPT | Performed by: PHYSICIAN ASSISTANT

## 2022-08-21 PROCEDURE — 120N000001 HC R&B MED SURG/OB

## 2022-08-21 PROCEDURE — 258N000003 HC RX IP 258 OP 636: Performed by: OBSTETRICS & GYNECOLOGY

## 2022-08-21 RX ORDER — BETAMETHASONE SODIUM PHOSPHATE AND BETAMETHASONE ACETATE 3; 3 MG/ML; MG/ML
12 INJECTION, SUSPENSION INTRA-ARTICULAR; INTRALESIONAL; INTRAMUSCULAR; SOFT TISSUE ONCE
Status: COMPLETED | OUTPATIENT
Start: 2022-08-21 | End: 2022-08-21

## 2022-08-21 RX ORDER — HYDROXYZINE HYDROCHLORIDE 50 MG/1
50 TABLET, FILM COATED ORAL EVERY 6 HOURS PRN
Status: DISCONTINUED | OUTPATIENT
Start: 2022-08-21 | End: 2022-08-22 | Stop reason: HOSPADM

## 2022-08-21 RX ORDER — DEXTROSE MONOHYDRATE 25 G/50ML
25-50 INJECTION, SOLUTION INTRAVENOUS
Status: DISCONTINUED | OUTPATIENT
Start: 2022-08-21 | End: 2022-08-22 | Stop reason: HOSPADM

## 2022-08-21 RX ORDER — NICOTINE POLACRILEX 4 MG
15-30 LOZENGE BUCCAL
Status: DISCONTINUED | OUTPATIENT
Start: 2022-08-21 | End: 2022-08-22 | Stop reason: HOSPADM

## 2022-08-21 RX ORDER — HYDROXYZINE HYDROCHLORIDE 25 MG/1
25 TABLET, FILM COATED ORAL EVERY 6 HOURS PRN
Status: DISCONTINUED | OUTPATIENT
Start: 2022-08-21 | End: 2022-08-22 | Stop reason: HOSPADM

## 2022-08-21 RX ORDER — CITALOPRAM HYDROBROMIDE 10 MG/1
10 TABLET ORAL DAILY
Status: DISCONTINUED | OUTPATIENT
Start: 2022-08-21 | End: 2022-08-22 | Stop reason: HOSPADM

## 2022-08-21 RX ORDER — FENTANYL CITRATE 50 UG/ML
50 INJECTION, SOLUTION INTRAMUSCULAR; INTRAVENOUS
Status: DISCONTINUED | OUTPATIENT
Start: 2022-08-21 | End: 2022-08-22 | Stop reason: HOSPADM

## 2022-08-21 RX ADMIN — NIFEDIPINE 20 MG: 10 CAPSULE ORAL at 19:35

## 2022-08-21 RX ADMIN — FENTANYL CITRATE 50 MCG: 50 INJECTION, SOLUTION INTRAMUSCULAR; INTRAVENOUS at 04:31

## 2022-08-21 RX ADMIN — CITALOPRAM HYDROBROMIDE 10 MG: 10 TABLET ORAL at 10:02

## 2022-08-21 RX ADMIN — NIFEDIPINE 20 MG: 10 CAPSULE ORAL at 01:16

## 2022-08-21 RX ADMIN — NIFEDIPINE 20 MG: 10 CAPSULE ORAL at 13:17

## 2022-08-21 RX ADMIN — BETAMETHASONE SODIUM PHOSPHATE AND BETAMETHASONE ACETATE 12 MG: 3; 3 INJECTION, SUSPENSION INTRA-ARTICULAR; INTRALESIONAL; INTRAMUSCULAR at 19:37

## 2022-08-21 RX ADMIN — NIFEDIPINE 20 MG: 10 CAPSULE ORAL at 07:26

## 2022-08-21 RX ADMIN — SODIUM CHLORIDE, POTASSIUM CHLORIDE, SODIUM LACTATE AND CALCIUM CHLORIDE 500 ML: 600; 310; 30; 20 INJECTION, SOLUTION INTRAVENOUS at 01:21

## 2022-08-21 ASSESSMENT — ACTIVITIES OF DAILY LIVING (ADL)
ADLS_ACUITY_SCORE: 20

## 2022-08-21 NOTE — PROVIDER NOTIFICATION
08/21/22 0418   Provider Notification   Provider Name/Title Dr. Crump   Method of Notification Phone   Request Evaluate - Remote   Notification Reason Pain   Pt states she is having 8/10 pain with contractions in lower abdomen for the last hour. MD gave TORB for SVE and PRN IV Fentanyl. If pt has advanced passed 4 cm dilation to let provider know.

## 2022-08-21 NOTE — PROVIDER NOTIFICATION
08/21/22 0905   Provider Notification   Provider Name/Title Dr. Rizvi   Method of Notification At Bedside   Request Evaluate in Person       Dr. Cid at bedside to assess patient and discuss plan of care. Plan to continue procardia q6hrs through 2nd dose of Betamethasone, intermittent monitoring every 6 hours, SCDs applied when in bed, and continue home regimen of blood glucose monitoring and use sliding scale when blood glucose above 140.     Bedside US done to confirm vertex presentation.

## 2022-08-21 NOTE — PROVIDER NOTIFICATION
08/20/22 2006   Provider Notification   Provider Name/Title Dr. Crump   Method of Notification Phone   Request Evaluate - Remote   Notification Reason Status Update   Updated on pt status, received 20mg procardia and rescue beta dose, LR bolus running. Pt is currently ctx q.2-8min, palpating mild/mod, pt states she feels some pressure with ctx now which is new. Pt also requesting NICU consult - will place order. TORB for SVE.

## 2022-08-21 NOTE — PROGRESS NOTES
[facility]  Labor Progress Note    S: Patient states she is doing OK.  Frequency and intensity of contractions are spacing.  No LOF or VB.  Eating and drinking, no CP or SOB.    O:   Patient Vitals for the past 4 hrs:   BP Temp Temp src Resp SpO2   22 0730 101/57 -- -- -- --   22 0715 105/56 97.8  F (36.6  C) Oral 18 98 %       SVE: /-3 per RN at 0430    FHT: Baseline 125, moderate variability, accelerations presetn, absent decelerations  [place]: Contractions every 2-10 minutes     A/P:  Ms. Ruth Persaud is a 32 year old  at 34w0d by 8w1d US, here for  labor, in the setting of h/o PTD x2.    Pre-term labor:   - S/p rescue BMZ #1/2 at 1946 on , repeat in 24 hours -  - Nifedipine tocolysis through rescue BMZ window   - Regular diet, if more uncomfortable or increase frequency/intensity of contraction change to clears   - Reviewed after BMZ window we would stop nifedipine and if PTL resumes, we would not stop labor.  Reviewed lifting restrictions for at home, as well as need for pelvic rest.  She is working 6 hours day, with breaks and sitting (works assembly line).   - NNP consult     FWB:   - Category I FHT, reactive   - If contraction frequency or intensity subside, change to NST every 6 hours    GDMA1:   - Medicine consulted   - Continue with FBG and 1 hour PP BGs with sliding scale insulin for coverage >140 while in house     PNC:   - Rh positive, Rubella Immune, GBS negative on 2022,  Placenta posterior    History of depression and anxiety  - Celexa 10 mg with PRN Atarax   - Recommend patient compliance with Celexa     Other:   - SCD PPx while in house  - S/p flu, Tdap 22  - s/p Covid vaccine    Michelle Alvarez MD   Pager: [phone]   2022

## 2022-08-21 NOTE — PROVIDER NOTIFICATION
08/20/22 2022   Provider Notification   Provider Name/Title Dr. Crump   Method of Notification Phone   Request Evaluate - Remote   Notification Reason SVE   SVE 4/50/-3, no change since last cervical exam. Pt instructed to report any change in ctx frequency and/or intensity.  prior to beta dose. Plan for NICU consult, OK to saline lock PIV, encourage to hydrate orally. Per MD will follow at home regimen for checking BG and consult with Hospitalist regarding BG plan in the morning if needed.

## 2022-08-21 NOTE — PROVIDER NOTIFICATION
08/21/22 0056   Provider Notification   Provider Name/Title Dr. Crump   Method of Notification At Bedside   Notification Reason Status Update     MD bedside to assess pt status, pt feels a little more uncomfortable with cramping rates her pain a 7/10. Pt denies pain medication. MD would like an IV fluid bolus 500ml. MD SVE unchanged from previous check.

## 2022-08-21 NOTE — PROVIDER NOTIFICATION
08/20/22 1908   Provider Notification   Provider Name/Title Dr. Crump   Method of Notification Phone   Request Evaluate - Remote     Dr. Crump called and updated on patient's status: IVF bolus infusing, patient continues to contract every 2-4 minutes. Provider updated that patient received Beta x2 on August 1st and 2nd. Order for 20mg of Procardia and rescue dose of Betamethasone.

## 2022-08-21 NOTE — PROVIDER NOTIFICATION
08/21/22 5930   Provider Notification   Provider Name/Title Dr. Rizvi   Method of Notification Phone     Dr. Rizvi updated on patient's status: noel every 8-10 minutes, palpating mild-moderate and VSS.

## 2022-08-21 NOTE — PROVIDER NOTIFICATION
08/20/22 1824   Provider Notification   Provider Name/Title Dr. Crump   Method of Notification Phone   Request Evaluate - Remote     Dr. Crump called and updated on SVE and that patient continues to contract palpating moderate every 2-4 minutes. Orders to admit to observe patient and give IVF bolus.

## 2022-08-21 NOTE — PROVIDER NOTIFICATION
08/20/22 2222   Provider Notification   Provider Name/Title Dr. Crump   Method of Notification Phone   Request Evaluate - Remote   Notification Reason Other (Comment)   TORB for nifedipine 20mg PO q.6hrs from first dose, will monitor BP's closely

## 2022-08-21 NOTE — PROGRESS NOTES
Hospitalist consult received for GDM management.  Patient has history of diet-controlled GDM.  Chart review completed.  Patient did receive betamethasone due to pre-term labor with current blood glucose levels stable.  Agree with current plan for sliding scale insulin while currently in labor.  We will continue to follow peripherally and make further recommendations if blood glucose becomes significantly elevated after steroids.  Otherwise no other further recommendations at this time. Please contact if any concerns arise and patient would need to be seen.     Tali Phillips PA-C

## 2022-08-21 NOTE — H&P
"2022    Ruth Suresh  [id number]      OB Admit History & Physical      Ms. Suresh  is here to rule out  labor.    She has noticed uterine contractions that have been ongoing since earlier this afternoon. Pt denies any vaginal bleeding, abnormal discharge, or leaking. States +FM. Pt is concerned since she has a complicated OBHx consistent with  deliveries at 35 and 36 wks.     Of note patient has been admitted back in 22 at which point she presented with same CC. She was found to be 2//-4. She was given BMZ X 2 and Nifedipine for tocolysis throughout BMZ window. Her GBS was collected and found to be negative.     Currently pt states that her pain during contractions has increased and scored now at 7/10. Agreeable to be checked. Sates +FM      No LMP recorded. Patient is pregnant.   Her Estimated Date of Delivery: Oct 2, 2022, making her 33w6d.      Estimated body mass index is 29.58 kg/m  as calculated from the following:    Height as of this encounter: 1.6 m (5' 3\").    Weight as of this encounter: 75.8 kg (167 lb).  Her prenatal course has been  complicated by   -GDMA1  -Hx of PTD X 2  -Covid-19 infection during 1st trimester  -Hx of depression or anxiety    See prenatal for labs.  neg GBS, Rubella Immune, RH Positive  - S/p flu, Tdap 22  - s/p Covid vaccine        She is a 32 year old   Her OB history:   OB History    Para Term  AB Living   7 3 1 2 3 3   SAB IAB Ectopic Multiple Live Births   2 1 0 0 3      # Outcome Date GA Lbr Marvel/2nd Weight Sex Delivery Anes PTL Lv   7 Current            6 SAB 21           5  16 36w4d  3.21 kg (7 lb 1.2 oz) M Vag-Spont  Y BREANA      Name: MARIS SURESH      Apgar1: 9  Apgar5: 9   4  12 35w0d  2.485 kg (5 lb 7.7 oz) M Vag-Spont  Y BREANA      Name: Alexandria      Apgar1: 9  Apgar5: 9   3 SAB 2011 6w0d          2 Term 07 38w3d 12:00 2.92 kg (6 lb 7 oz) M Vag-Spont EPI Y BREANA      Name: Zenaida      " "Apgar1: 8  Apgar5: 9   1 IAB 10/2006 8w0d                   Past Medical History:   Diagnosis Date     Diabetes (H)           Past Surgical History:   Procedure Laterality Date     WISDOM TOOTH EXTRACTION           No current outpatient medications on file.       Allergies: Patient has no known allergies.      REVIEW OF SYSTEMS:  NEUROLOGIC:  Negative  EYES:  Negative  ENT:  Negative  GI:  Negative  :  Negative  GYN:  Negative  CV:  Negative  PULMONARY:  Negative  MUSCULOSKELETAL:  Negative  PSYCH:  Negative      Social History     Socioeconomic History     Marital status:      Spouse name: Not on file     Number of children: Not on file     Years of education: Not on file     Highest education level: Not on file   Occupational History     Not on file   Tobacco Use     Smoking status: Never Smoker     Smokeless tobacco: Never Used   Substance and Sexual Activity     Alcohol use: Never     Drug use: Never     Sexual activity: Not on file   Other Topics Concern     Not on file   Social History Narrative     Not on file     Social Determinants of Health     Financial Resource Strain: Not on file   Food Insecurity: Not on file   Transportation Needs: Not on file   Physical Activity: Not on file   Stress: Not on file   Social Connections: Not on file   Intimate Partner Violence: Not on file   Housing Stability: Not on file      History reviewed. No pertinent family history.      Exam:    Vitals:   /59   Temp 98.5  F (36.9  C) (Oral)   Resp 16   Ht 1.6 m (5' 3\")   Wt 75.8 kg (167 lb)   SpO2 100%   BMI 29.58 kg/m    [place]:  ctxs q 1-4 min  FHT: 120 bpm, mod, a +, d -    Alert Awake in NAD  HEENT grossly normal  Neck: no lymphadenopathy or thryoidomegaly  Lungs CTAB  Back No CVAT  Heart RR  ABD gravid, NABS, soft, NT on exam with NT fundus palpable  Cervix is 4/50/-2 per RN  EXT:  no edema, no calf tenderness      Assessment/Plan: Ruth ePrsaud is a 32 year old  at 33w6d GA by  here with  " labor.     ANTEPARTUM  Prenatal Care:  - OB labs reviewed: AB, Rubella immune, Heb B Ag non-reactive, HIV negative, RPR negative  - Genetics: declined  - Anatomy ultrasound: normal level 2 US except for missed anatomy (recheck at 28 weeks was normal)  - Rh positive, Rhogam not indicated  - GCT  failed, failed 2 hr GTT at 28 weeks along with hgb 11.8, RPR neg  - S/p flu, Tdap 22  - s/p Covid vaccine   - GBS NEG (22)  - Feed: planning on pumping  - Contraception: plan on progesterone only pill    INTRAPARTUM  - prolonged monitoring  - FHT Cat I  - Wet prep and UA neg    GDMA1  - given rescue dose of BMZ will have hospitalist consult for BS management. Will continue with BS checks q 4 pts until consultation is done  - will need 2 hr GTT PP  - Growth US 22- EFW 46%, Cephalic, MVP 5.2      labor  - SVE change from 2 to 4 cm  - first BMZ course given -  - rescue BMZ course -  - GBS neg on 22  - Nifedipine 20 mg loading dose, will continue during BMZ rescue course window  - NICU consulted    Continue close monitoring      Nahomi Crump MD  Dept of OB/GYN  2022

## 2022-08-22 ENCOUNTER — HOSPITAL ENCOUNTER (INPATIENT)
Facility: CLINIC | Age: 32
End: 2022-08-22
Admitting: OBSTETRICS & GYNECOLOGY
Payer: COMMERCIAL

## 2022-08-22 VITALS
BODY MASS INDEX: 29.59 KG/M2 | DIASTOLIC BLOOD PRESSURE: 55 MMHG | OXYGEN SATURATION: 98 % | TEMPERATURE: 97.7 F | SYSTOLIC BLOOD PRESSURE: 107 MMHG | WEIGHT: 167 LBS | HEIGHT: 63 IN | RESPIRATION RATE: 15 BRPM

## 2022-08-22 LAB
GLUCOSE BLDC GLUCOMTR-MCNC: 163 MG/DL (ref 70–99)
GLUCOSE BLDC GLUCOMTR-MCNC: 186 MG/DL (ref 70–99)
GLUCOSE BLDC GLUCOMTR-MCNC: 196 MG/DL (ref 70–99)

## 2022-08-22 PROCEDURE — 250N000013 HC RX MED GY IP 250 OP 250 PS 637: Performed by: OBSTETRICS & GYNECOLOGY

## 2022-08-22 PROCEDURE — 250N000012 HC RX MED GY IP 250 OP 636 PS 637: Performed by: OBSTETRICS & GYNECOLOGY

## 2022-08-22 RX ADMIN — NIFEDIPINE 20 MG: 10 CAPSULE ORAL at 13:30

## 2022-08-22 RX ADMIN — NIFEDIPINE 20 MG: 10 CAPSULE ORAL at 07:26

## 2022-08-22 RX ADMIN — NIFEDIPINE 20 MG: 10 CAPSULE ORAL at 01:26

## 2022-08-22 RX ADMIN — INSULIN ASPART 1 UNITS: 100 INJECTION, SOLUTION INTRAVENOUS; SUBCUTANEOUS at 08:04

## 2022-08-22 RX ADMIN — CITALOPRAM HYDROBROMIDE 10 MG: 10 TABLET ORAL at 08:07

## 2022-08-22 ASSESSMENT — ACTIVITIES OF DAILY LIVING (ADL)
ADLS_ACUITY_SCORE: 20

## 2022-08-22 NOTE — PLAN OF CARE
MD in department.  Next dose of procardia due at 1330 and to be given.  Patient will be 24 hours from her second dose of betamethasone at 1937 today.  Patient requesting SVE before discharge, MD is okay with that.  Work note put in patient's chart to be given to her at discharge. MD would like her to have a follow-up appointment scheduled this week.  Patient is agreeable to the plan of care will update MD at 1930 for discharge orders if appropriate.

## 2022-08-22 NOTE — DISCHARGE INSTRUCTIONS
Discharge Instruction for Undelivered Patients      You were seen for: Labor Assessment  We Consulted: Dr. Stafford  You had (Test or Medicine):fetal and uterine monitoring, betamethasone x2, procardia, NICU consult.     Diet:   Drink 8 to 12 glasses of liquids (milk, juice, water) every day.  You may eat meals and snacks.     Activity:  Rest the pelvic area. No sex. Do not stimulate breasts or nipples.  Stay on bed rest or partial bed rest. This means remain horizontal as often as possible =)  Count fetal kicks everyday (see handout)  Call your doctor or nurse midwife if your baby is moving less than usual.     Call your provider if you notice:  Swelling in your face or increased swelling in your hands or legs.  Headaches that are not relieved by Tylenol (acetaminophen).  Changes in your vision (blurring: seeing spots or stars.)  Nausea (sick to your stomach) and vomiting (throwing up).   Weight gain of 5 pounds or more per week.  Heartburn that doesn't go away.  Signs of bladder infection: pain when you urinate (use the toilet), need to go more often and more urgently.  The bag of boyd (rupture of membranes) breaks, or you notice leaking in your underwear.  Bright red blood in your underwear.  Abdominal (lower belly) or stomach pain.  For first baby: Contractions (tightening) less than 5 minutes apart for one hour or more.  Second (plus) baby: Contractions (tightening) less than 10 minutes apart and getting stronger.  *If less than 34 weeks: Contractions (tightening) more than 6 times in one hour.  Increase or change in vaginal discharge (note the color and amount)  Other: Call your doctor or midwife with any questions or concerns.     Follow-up:  *Make an appointment to be seen this week.  *Modified bedrest until 36 weeks.

## 2022-08-22 NOTE — DISCHARGE SUMMARY
[facility] Discharge Summary    Ruth Persaud MRN# [id number]   Age: 32 year old YOB: 1990     Date of Admission:  2022  Date of Discharge::  2022  Admitting Physician:  Nahomi Crump MD  Discharge Physician:  Kellen Stafford MD     Home clinic: [facility]          Admission Diagnoses:   Encounter for triage in pregnant patient [Z36.89]   labor [O60.00]  Hx of  delivery x2  A1 GDM          Discharge Diagnosis:    contractions  S/p betamethasone rescue course ,  1900  Intrauterine pregnancy at 34.1 weeks gestation          Procedures:   Procedure(s): none       No procedures performed during this admission           Medications Prior to Admission:     Medications Prior to Admission   Medication Sig Dispense Refill Last Dose     citalopram (CELEXA) 10 MG tablet Take 10 mg by mouth daily        Ascorbic Acid (VITAMIN C) 100 MG CHEW         Prenatal Vit-Fe Fumarate-FA (PRENATAL MULTIVITAMIN W/IRON) 27-0.8 MG tablet Take 1 tablet by mouth daily        vitamin D3 (CHOLECALCIFEROL) 1.25 MG (47130 UT) capsule Take 50,000 Units by mouth every 7 days                Discharge Medications:     Current Discharge Medication List      CONTINUE these medications which have NOT CHANGED    Details   citalopram (CELEXA) 10 MG tablet Take 10 mg by mouth daily      Ascorbic Acid (VITAMIN C) 100 MG CHEW       Prenatal Vit-Fe Fumarate-FA (PRENATAL MULTIVITAMIN W/IRON) 27-0.8 MG tablet Take 1 tablet by mouth daily      vitamin D3 (CHOLECALCIFEROL) 1.25 MG (77128 UT) capsule Take 50,000 Units by mouth every 7 days                   Consultations:   Consultation during this admission received from internal medicine and M, NICU          Brief History of Labor:   n/a           Hospital Course:   The patient's hospital course was unremarkable.  She presented on the evening of , with painful  contractions, and hx of PTB x2.  She had  received betamethasone on 8/1 and 8/2, and, when labor did not progress, a rescue course was given on 8/20, 8/21. She received procardia oral tocolysis during that 48h. On discharge, her pain was well controlled, with rare contractions. Baby was moving well, with Category 1 tracing. No further cervical change. She will be discharged to home. She prefers to be off of work now, and will follow in the clinic weekly.     Post-partum hemoglobin:   Hemoglobin   Date Value Ref Range Status   08/20/2022 12.4 11.7 - 15.7 g/dL Final   01/28/2013 15.1 11.7 - 15.7 g/dL Final             Discharge Instructions and Follow-Up:   Discharge diet: Regular   Discharge activity: Activity as tolerated   Discharge follow-up: Follow up with primary care provider in 2-4 days   Wound care: Drink plenty of fluids           Discharge Disposition:   Discharged to home        Kellen Stafford MD

## 2022-08-22 NOTE — PROGRESS NOTES
"Antepartum NOTE    Subjective: Reports doing okay. She is nervous about everything that is going on, but feels that her contractions have continued to space. She did lose her mucous plug this morning (shown photo), and reassurance was offered. No LOF/VB. No headache, vision changes, RUQ pain, n/v/c/d, CP/SOB.    Objective:  /53 (BP Location: Right arm, Patient Position: Per self, Cuff Size: Adult Regular)   Temp 97.7  F (36.5  C) (Oral)   Resp 15   Ht 1.6 m (5' 3\")   Wt 75.8 kg (167 lb)   SpO2 98%   BMI 29.58 kg/m     FHT: category 1  [place]: rare  SVE: not repeated    A/P:  Ms. Ruth Persaud is a 32 year old  at 34w1d by 8w1d US, here for  labor, in the setting of h/o PTD x2.     Pre-term labor:   - S/p rescue BMZ #1/2 at 1946 on , repeat in 24 hours -  - Nifedipine tocolysis through rescue BMZ window   - Regular diet, if more uncomfortable or increase frequency/intensity of contraction change to clears   - Reviewed after BMZ window we would stop nifedipine and if PTL resumes, we would not stop labor.  Reviewed lifting restrictions for at home, as well as need for pelvic rest.  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   - NNP consult      FWB:     - Category I FHT, reactive   - If contraction frequency or intensity subside, change to NST every 6 hours     GDMA1:   - Medicine consulted   - Continue with FBG and 1 hour PP BGs with sliding scale insulin for coverage >140 while in house      PNC:      - Rh positive, Rubella Immune, GBS negative on 2022,  Placenta posterior     History of depression and anxiety  - Celexa 10 mg with PRN Atarax   - Recommend patient compliance with Celexa      Other:   - SCD PPx while in house  - S/p flu, Tdap 22  - s/p Covid vaccine    Dispo:  -Plan for SVE this afternoon, with discharge after 1900, as 48h of rescue steroids will be complete.    Kellen Stafford MD    "

## 2022-08-22 NOTE — PROGRESS NOTES
"LABOR NOTE    Subjective: Reports no further cramping at present. Repeat SVE unchanged.    Objective:  /55   Temp 97.7  F (36.5  C) (Oral)   Resp 15   Ht 1.6 m (5' 3\")   Wt 75.8 kg (167 lb)   SpO2 98%   BMI 29.58 kg/m     FHT: reactive NST  [place]: none on  SVE: /-1 soft post    A/P:  Ms. Ruht Persaud is a 32 year old  at 34w1d by 8w1d US, here for  labor, in the setting of h/o PTD x2.     Pre-term labor:   - S/p rescue BMZ #1/2 at 1946 on , repeat in 24 hours -  - Nifedipine tocolysis through rescue BMZ window   - Regular diet, if more uncomfortable or increase frequency/intensity of contraction change to clears   - Reviewed after BMZ window we would stop nifedipine and if PTL resumes, we would not stop labor.  Reviewed lifting restrictions for at home, as well as need for pelvic rest.  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   - NNP consult      FWB:     - Category I FHT, reactive   - If contraction frequency or intensity subside, change to NST every 6 hours     GDMA1:   - Medicine consulted   - Continue with FBG and 1 hour PP BGs with sliding scale insulin for coverage >140 while in house      PNC:      - Rh positive, Rubella Immune, GBS negative on 2022,  Placenta posterior     History of depression and anxiety  - Celexa 10 mg with PRN Atarax   - Recommend patient compliance with Celexa      Other:   - SCD PPx while in house  - S/p flu, Tdap 22  - s/p Covid vaccine     Dispo:  -Plan for SVE this evening, with discharge after 1900, as 48h of rescue steroids will be complete.      Kellen Stafford MD    "

## 2022-08-22 NOTE — PLAN OF CARE
Pt vitals stable. Ruth was able to sleep well overnight. She reports feeling tightness a couple of times per hour but only very occasionally feels contractions that are painful. Pt aware to call if she feels her contractions become more regular or painful. Denies leaking fluid or vaginal bleeding. Fetus active, category 1.

## 2022-09-03 ENCOUNTER — HEALTH MAINTENANCE LETTER (OUTPATIENT)
Age: 32
End: 2022-09-03

## 2022-09-26 ENCOUNTER — ANESTHESIA EVENT (OUTPATIENT)
Dept: OBGYN | Facility: CLINIC | Age: 32
End: 2022-09-26
Payer: COMMERCIAL

## 2022-09-26 ENCOUNTER — HOSPITAL ENCOUNTER (INPATIENT)
Facility: CLINIC | Age: 32
LOS: 1 days | Discharge: HOME | End: 2022-09-27
Attending: OBSTETRICS & GYNECOLOGY | Admitting: OBSTETRICS & GYNECOLOGY
Payer: COMMERCIAL

## 2022-09-26 ENCOUNTER — ANESTHESIA (OUTPATIENT)
Dept: OBGYN | Facility: CLINIC | Age: 32
End: 2022-09-26
Payer: COMMERCIAL

## 2022-09-26 LAB
ABO/RH(D): NORMAL
ANTIBODY SCREEN: NEGATIVE
GLUCOSE BLDC GLUCOMTR-MCNC: 107 MG/DL (ref 70–99)
GLUCOSE BLDC GLUCOMTR-MCNC: 109 MG/DL (ref 70–99)
GLUCOSE BLDC GLUCOMTR-MCNC: 60 MG/DL (ref 70–99)
GLUCOSE BLDC GLUCOMTR-MCNC: 69 MG/DL (ref 70–99)
GLUCOSE BLDC GLUCOMTR-MCNC: 80 MG/DL (ref 70–99)
HGB BLD-MCNC: 13.6 G/DL (ref 11.7–15.7)
SPECIMEN EXPIRATION DATE: NORMAL
T PALLIDUM AB SER QL: NONREACTIVE

## 2022-09-26 PROCEDURE — 370N000003 HC ANESTHESIA WARD SERVICE

## 2022-09-26 PROCEDURE — 250N000011 HC RX IP 250 OP 636: Performed by: ANESTHESIOLOGY

## 2022-09-26 PROCEDURE — 86901 BLOOD TYPING SEROLOGIC RH(D): CPT | Performed by: OBSTETRICS & GYNECOLOGY

## 2022-09-26 PROCEDURE — 85018 HEMOGLOBIN: CPT | Performed by: OBSTETRICS & GYNECOLOGY

## 2022-09-26 PROCEDURE — 250N000011 HC RX IP 250 OP 636: Performed by: OBSTETRICS & GYNECOLOGY

## 2022-09-26 PROCEDURE — 10907ZC DRAINAGE OF AMNIOTIC FLUID, THERAPEUTIC FROM PRODUCTS OF CONCEPTION, VIA NATURAL OR ARTIFICIAL OPENING: ICD-10-PCS | Performed by: OBSTETRICS & GYNECOLOGY

## 2022-09-26 PROCEDURE — 3E033VJ INTRODUCTION OF OTHER HORMONE INTO PERIPHERAL VEIN, PERCUTANEOUS APPROACH: ICD-10-PCS | Performed by: OBSTETRICS & GYNECOLOGY

## 2022-09-26 PROCEDURE — 3E0R3BZ INTRODUCTION OF ANESTHETIC AGENT INTO SPINAL CANAL, PERCUTANEOUS APPROACH: ICD-10-PCS | Performed by: OBSTETRICS & GYNECOLOGY

## 2022-09-26 PROCEDURE — 86780 TREPONEMA PALLIDUM: CPT | Performed by: OBSTETRICS & GYNECOLOGY

## 2022-09-26 PROCEDURE — 722N000001 HC LABOR CARE VAGINAL DELIVERY SINGLE

## 2022-09-26 PROCEDURE — 86850 RBC ANTIBODY SCREEN: CPT | Performed by: OBSTETRICS & GYNECOLOGY

## 2022-09-26 PROCEDURE — 00HU33Z INSERTION OF INFUSION DEVICE INTO SPINAL CANAL, PERCUTANEOUS APPROACH: ICD-10-PCS | Performed by: OBSTETRICS & GYNECOLOGY

## 2022-09-26 PROCEDURE — 258N000003 HC RX IP 258 OP 636: Performed by: OBSTETRICS & GYNECOLOGY

## 2022-09-26 PROCEDURE — 250N000009 HC RX 250: Performed by: OBSTETRICS & GYNECOLOGY

## 2022-09-26 PROCEDURE — 250N000013 HC RX MED GY IP 250 OP 250 PS 637: Performed by: OBSTETRICS & GYNECOLOGY

## 2022-09-26 PROCEDURE — 120N000001 HC R&B MED SURG/OB

## 2022-09-26 RX ORDER — MISOPROSTOL 200 UG/1
400 TABLET ORAL
Status: DISCONTINUED | OUTPATIENT
Start: 2022-09-26 | End: 2022-09-26 | Stop reason: HOSPADM

## 2022-09-26 RX ORDER — KETOROLAC TROMETHAMINE 30 MG/ML
30 INJECTION, SOLUTION INTRAMUSCULAR; INTRAVENOUS
Status: DISCONTINUED | OUTPATIENT
Start: 2022-09-26 | End: 2022-09-27

## 2022-09-26 RX ORDER — IBUPROFEN 800 MG/1
800 TABLET, FILM COATED ORAL EVERY 6 HOURS PRN
Qty: 40 TABLET | Refills: 1 | Status: SHIPPED | OUTPATIENT
Start: 2022-09-26

## 2022-09-26 RX ORDER — DOCUSATE SODIUM 100 MG/1
100 CAPSULE, LIQUID FILLED ORAL DAILY
Status: DISCONTINUED | OUTPATIENT
Start: 2022-09-26 | End: 2022-09-27 | Stop reason: HOSPADM

## 2022-09-26 RX ORDER — OXYTOCIN 10 [USP'U]/ML
10 INJECTION, SOLUTION INTRAMUSCULAR; INTRAVENOUS
Status: DISCONTINUED | OUTPATIENT
Start: 2022-09-26 | End: 2022-09-27 | Stop reason: HOSPADM

## 2022-09-26 RX ORDER — DEXTROSE MONOHYDRATE 25 G/50ML
25-50 INJECTION, SOLUTION INTRAVENOUS
Status: DISCONTINUED | OUTPATIENT
Start: 2022-09-26 | End: 2022-09-26 | Stop reason: HOSPADM

## 2022-09-26 RX ORDER — NALOXONE HYDROCHLORIDE 0.4 MG/ML
0.4 INJECTION, SOLUTION INTRAMUSCULAR; INTRAVENOUS; SUBCUTANEOUS
Status: DISCONTINUED | OUTPATIENT
Start: 2022-09-26 | End: 2022-09-26 | Stop reason: HOSPADM

## 2022-09-26 RX ORDER — MISOPROSTOL 200 UG/1
800 TABLET ORAL
Status: DISCONTINUED | OUTPATIENT
Start: 2022-09-26 | End: 2022-09-26 | Stop reason: HOSPADM

## 2022-09-26 RX ORDER — CARBOPROST TROMETHAMINE 250 UG/ML
250 INJECTION, SOLUTION INTRAMUSCULAR
Status: DISCONTINUED | OUTPATIENT
Start: 2022-09-26 | End: 2022-09-26 | Stop reason: HOSPADM

## 2022-09-26 RX ORDER — HYDROCORTISONE 25 MG/G
CREAM TOPICAL 3 TIMES DAILY PRN
Qty: 30 G | Refills: 0 | Status: SHIPPED | OUTPATIENT
Start: 2022-09-26

## 2022-09-26 RX ORDER — CITALOPRAM HYDROBROMIDE 10 MG/1
10 TABLET ORAL DAILY
Status: CANCELLED | OUTPATIENT
Start: 2022-09-26

## 2022-09-26 RX ORDER — MISOPROSTOL 200 UG/1
400 TABLET ORAL
Status: DISCONTINUED | OUTPATIENT
Start: 2022-09-26 | End: 2022-09-27 | Stop reason: HOSPADM

## 2022-09-26 RX ORDER — SODIUM CHLORIDE 9 MG/ML
INJECTION, SOLUTION INTRAVENOUS CONTINUOUS
Status: DISCONTINUED | OUTPATIENT
Start: 2022-09-26 | End: 2022-09-26 | Stop reason: HOSPADM

## 2022-09-26 RX ORDER — TRANEXAMIC ACID 10 MG/ML
1 INJECTION, SOLUTION INTRAVENOUS EVERY 30 MIN PRN
Status: DISCONTINUED | OUTPATIENT
Start: 2022-09-26 | End: 2022-09-26 | Stop reason: HOSPADM

## 2022-09-26 RX ORDER — IBUPROFEN 800 MG/1
800 TABLET, FILM COATED ORAL EVERY 6 HOURS PRN
Status: DISCONTINUED | OUTPATIENT
Start: 2022-09-26 | End: 2022-09-27 | Stop reason: HOSPADM

## 2022-09-26 RX ORDER — METOCLOPRAMIDE 10 MG/1
10 TABLET ORAL EVERY 6 HOURS PRN
Status: DISCONTINUED | OUTPATIENT
Start: 2022-09-26 | End: 2022-09-26 | Stop reason: HOSPADM

## 2022-09-26 RX ORDER — PROCHLORPERAZINE MALEATE 10 MG
10 TABLET ORAL EVERY 6 HOURS PRN
Status: DISCONTINUED | OUTPATIENT
Start: 2022-09-26 | End: 2022-09-26 | Stop reason: HOSPADM

## 2022-09-26 RX ORDER — SODIUM CHLORIDE, SODIUM LACTATE, POTASSIUM CHLORIDE, CALCIUM CHLORIDE 600; 310; 30; 20 MG/100ML; MG/100ML; MG/100ML; MG/100ML
INJECTION, SOLUTION INTRAVENOUS CONTINUOUS PRN
Status: DISCONTINUED | OUTPATIENT
Start: 2022-09-26 | End: 2022-09-26 | Stop reason: HOSPADM

## 2022-09-26 RX ORDER — SODIUM CHLORIDE, SODIUM LACTATE, POTASSIUM CHLORIDE, CALCIUM CHLORIDE 600; 310; 30; 20 MG/100ML; MG/100ML; MG/100ML; MG/100ML
INJECTION, SOLUTION INTRAVENOUS CONTINUOUS
Status: DISCONTINUED | OUTPATIENT
Start: 2022-09-26 | End: 2022-09-26 | Stop reason: HOSPADM

## 2022-09-26 RX ORDER — NICOTINE POLACRILEX 4 MG
15-30 LOZENGE BUCCAL
Status: DISCONTINUED | OUTPATIENT
Start: 2022-09-26 | End: 2022-09-27 | Stop reason: HOSPADM

## 2022-09-26 RX ORDER — CITRIC ACID/SODIUM CITRATE 334-500MG
30 SOLUTION, ORAL ORAL
Status: DISCONTINUED | OUTPATIENT
Start: 2022-09-26 | End: 2022-09-26 | Stop reason: HOSPADM

## 2022-09-26 RX ORDER — OXYTOCIN 10 [USP'U]/ML
10 INJECTION, SOLUTION INTRAMUSCULAR; INTRAVENOUS
Status: DISCONTINUED | OUTPATIENT
Start: 2022-09-26 | End: 2022-09-26 | Stop reason: HOSPADM

## 2022-09-26 RX ORDER — ONDANSETRON 2 MG/ML
4 INJECTION INTRAMUSCULAR; INTRAVENOUS EVERY 6 HOURS PRN
Status: DISCONTINUED | OUTPATIENT
Start: 2022-09-26 | End: 2022-09-26 | Stop reason: HOSPADM

## 2022-09-26 RX ORDER — OXYTOCIN/0.9 % SODIUM CHLORIDE 30/500 ML
1-24 PLASTIC BAG, INJECTION (ML) INTRAVENOUS CONTINUOUS
Status: DISCONTINUED | OUTPATIENT
Start: 2022-09-26 | End: 2022-09-26 | Stop reason: HOSPADM

## 2022-09-26 RX ORDER — BISACODYL 10 MG
10 SUPPOSITORY, RECTAL RECTAL DAILY PRN
Status: DISCONTINUED | OUTPATIENT
Start: 2022-09-26 | End: 2022-09-27 | Stop reason: HOSPADM

## 2022-09-26 RX ORDER — MODIFIED LANOLIN
OINTMENT (GRAM) TOPICAL
Qty: 7 G | Refills: 3 | Status: SHIPPED | OUTPATIENT
Start: 2022-09-26

## 2022-09-26 RX ORDER — HYDROCORTISONE 25 MG/G
CREAM TOPICAL 3 TIMES DAILY PRN
Status: DISCONTINUED | OUTPATIENT
Start: 2022-09-26 | End: 2022-09-27 | Stop reason: HOSPADM

## 2022-09-26 RX ORDER — ACETAMINOPHEN 325 MG/1
650 TABLET ORAL EVERY 4 HOURS PRN
Status: DISCONTINUED | OUTPATIENT
Start: 2022-09-26 | End: 2022-09-27 | Stop reason: HOSPADM

## 2022-09-26 RX ORDER — NALOXONE HYDROCHLORIDE 0.4 MG/ML
0.2 INJECTION, SOLUTION INTRAMUSCULAR; INTRAVENOUS; SUBCUTANEOUS
Status: DISCONTINUED | OUTPATIENT
Start: 2022-09-26 | End: 2022-09-26 | Stop reason: HOSPADM

## 2022-09-26 RX ORDER — METOCLOPRAMIDE HYDROCHLORIDE 5 MG/ML
10 INJECTION INTRAMUSCULAR; INTRAVENOUS EVERY 6 HOURS PRN
Status: DISCONTINUED | OUTPATIENT
Start: 2022-09-26 | End: 2022-09-26 | Stop reason: HOSPADM

## 2022-09-26 RX ORDER — EPHEDRINE SULFATE 50 MG/ML
5 INJECTION, SOLUTION INTRAMUSCULAR; INTRAVENOUS; SUBCUTANEOUS
Status: DISCONTINUED | OUTPATIENT
Start: 2022-09-26 | End: 2022-09-26 | Stop reason: HOSPADM

## 2022-09-26 RX ORDER — DEXTROSE, SODIUM CHLORIDE, SODIUM LACTATE, POTASSIUM CHLORIDE, AND CALCIUM CHLORIDE 5; .6; .31; .03; .02 G/100ML; G/100ML; G/100ML; G/100ML; G/100ML
INJECTION, SOLUTION INTRAVENOUS CONTINUOUS
Status: DISCONTINUED | OUTPATIENT
Start: 2022-09-26 | End: 2022-09-26 | Stop reason: HOSPADM

## 2022-09-26 RX ORDER — NICOTINE POLACRILEX 4 MG
15-30 LOZENGE BUCCAL
Status: DISCONTINUED | OUTPATIENT
Start: 2022-09-26 | End: 2022-09-26 | Stop reason: HOSPADM

## 2022-09-26 RX ORDER — TRANEXAMIC ACID 10 MG/ML
1 INJECTION, SOLUTION INTRAVENOUS EVERY 30 MIN PRN
Status: DISCONTINUED | OUTPATIENT
Start: 2022-09-26 | End: 2022-09-27 | Stop reason: HOSPADM

## 2022-09-26 RX ORDER — OXYTOCIN/0.9 % SODIUM CHLORIDE 30/500 ML
340 PLASTIC BAG, INJECTION (ML) INTRAVENOUS CONTINUOUS PRN
Status: DISCONTINUED | OUTPATIENT
Start: 2022-09-26 | End: 2022-09-26 | Stop reason: HOSPADM

## 2022-09-26 RX ORDER — MISOPROSTOL 200 UG/1
800 TABLET ORAL
Status: DISCONTINUED | OUTPATIENT
Start: 2022-09-26 | End: 2022-09-27 | Stop reason: HOSPADM

## 2022-09-26 RX ORDER — CARBOPROST TROMETHAMINE 250 UG/ML
250 INJECTION, SOLUTION INTRAMUSCULAR
Status: DISCONTINUED | OUTPATIENT
Start: 2022-09-26 | End: 2022-09-27 | Stop reason: HOSPADM

## 2022-09-26 RX ORDER — IBUPROFEN 800 MG/1
800 TABLET, FILM COATED ORAL
Status: DISCONTINUED | OUTPATIENT
Start: 2022-09-26 | End: 2022-09-27

## 2022-09-26 RX ORDER — DOCUSATE SODIUM 100 MG/1
100 CAPSULE, LIQUID FILLED ORAL DAILY
Qty: 30 CAPSULE | Refills: 0 | Status: SHIPPED | OUTPATIENT
Start: 2022-09-26

## 2022-09-26 RX ORDER — METHYLERGONOVINE MALEATE 0.2 MG/ML
200 INJECTION INTRAVENOUS
Status: DISCONTINUED | OUTPATIENT
Start: 2022-09-26 | End: 2022-09-27 | Stop reason: HOSPADM

## 2022-09-26 RX ORDER — FENTANYL CITRATE 50 UG/ML
100 INJECTION, SOLUTION INTRAMUSCULAR; INTRAVENOUS
Status: DISCONTINUED | OUTPATIENT
Start: 2022-09-26 | End: 2022-09-26 | Stop reason: HOSPADM

## 2022-09-26 RX ORDER — METHYLERGONOVINE MALEATE 0.2 MG/ML
200 INJECTION INTRAVENOUS
Status: DISCONTINUED | OUTPATIENT
Start: 2022-09-26 | End: 2022-09-26 | Stop reason: HOSPADM

## 2022-09-26 RX ORDER — ONDANSETRON 4 MG/1
4 TABLET, ORALLY DISINTEGRATING ORAL EVERY 6 HOURS PRN
Status: DISCONTINUED | OUTPATIENT
Start: 2022-09-26 | End: 2022-09-26 | Stop reason: HOSPADM

## 2022-09-26 RX ORDER — PROCHLORPERAZINE 25 MG
25 SUPPOSITORY, RECTAL RECTAL EVERY 12 HOURS PRN
Status: DISCONTINUED | OUTPATIENT
Start: 2022-09-26 | End: 2022-09-26 | Stop reason: HOSPADM

## 2022-09-26 RX ORDER — OXYTOCIN/0.9 % SODIUM CHLORIDE 30/500 ML
100-340 PLASTIC BAG, INJECTION (ML) INTRAVENOUS CONTINUOUS PRN
Status: DISCONTINUED | OUTPATIENT
Start: 2022-09-26 | End: 2022-09-27 | Stop reason: HOSPADM

## 2022-09-26 RX ORDER — NALBUPHINE HYDROCHLORIDE 10 MG/ML
2.5-5 INJECTION, SOLUTION INTRAMUSCULAR; INTRAVENOUS; SUBCUTANEOUS EVERY 6 HOURS PRN
Status: DISCONTINUED | OUTPATIENT
Start: 2022-09-26 | End: 2022-09-27 | Stop reason: HOSPADM

## 2022-09-26 RX ORDER — DEXTROSE MONOHYDRATE 25 G/50ML
25-50 INJECTION, SOLUTION INTRAVENOUS
Status: DISCONTINUED | OUTPATIENT
Start: 2022-09-26 | End: 2022-09-27 | Stop reason: HOSPADM

## 2022-09-26 RX ORDER — LIDOCAINE 40 MG/G
CREAM TOPICAL
Status: DISCONTINUED | OUTPATIENT
Start: 2022-09-26 | End: 2022-09-26 | Stop reason: HOSPADM

## 2022-09-26 RX ORDER — OXYTOCIN/0.9 % SODIUM CHLORIDE 30/500 ML
340 PLASTIC BAG, INJECTION (ML) INTRAVENOUS CONTINUOUS PRN
Status: DISCONTINUED | OUTPATIENT
Start: 2022-09-26 | End: 2022-09-27 | Stop reason: HOSPADM

## 2022-09-26 RX ORDER — MODIFIED LANOLIN
OINTMENT (GRAM) TOPICAL
Status: DISCONTINUED | OUTPATIENT
Start: 2022-09-26 | End: 2022-09-27 | Stop reason: HOSPADM

## 2022-09-26 RX ADMIN — IBUPROFEN 800 MG: 800 TABLET, FILM COATED ORAL at 22:12

## 2022-09-26 RX ADMIN — SODIUM CHLORIDE, SODIUM LACTATE, POTASSIUM CHLORIDE, CALCIUM CHLORIDE AND DEXTROSE MONOHYDRATE 125 ML/HR: 5; 600; 310; 30; 20 INJECTION, SOLUTION INTRAVENOUS at 13:41

## 2022-09-26 RX ADMIN — METHYLERGONOVINE MALEATE 200 MCG: 0.2 INJECTION, SOLUTION INTRAMUSCULAR; INTRAVENOUS at 17:23

## 2022-09-26 RX ADMIN — Medication 2 MILLI-UNITS/MIN: at 09:34

## 2022-09-26 RX ADMIN — ACETAMINOPHEN 650 MG: 325 TABLET, FILM COATED ORAL at 18:48

## 2022-09-26 RX ADMIN — SODIUM CHLORIDE, POTASSIUM CHLORIDE, SODIUM LACTATE AND CALCIUM CHLORIDE 125 ML/HR: 600; 310; 30; 20 INJECTION, SOLUTION INTRAVENOUS at 08:38

## 2022-09-26 RX ADMIN — Medication: at 11:40

## 2022-09-26 RX ADMIN — KETOROLAC TROMETHAMINE 30 MG: 30 INJECTION, SOLUTION INTRAMUSCULAR at 15:30

## 2022-09-26 RX ADMIN — MISOPROSTOL 800 MCG: 200 TABLET ORAL at 17:25

## 2022-09-26 RX ADMIN — SODIUM CHLORIDE, POTASSIUM CHLORIDE, SODIUM LACTATE AND CALCIUM CHLORIDE 1000 ML: 600; 310; 30; 20 INJECTION, SOLUTION INTRAVENOUS at 11:07

## 2022-09-26 ASSESSMENT — ACTIVITIES OF DAILY LIVING (ADL)
CHANGE_IN_FUNCTIONAL_STATUS_SINCE_ONSET_OF_CURRENT_ILLNESS/INJURY: NO
ADLS_ACUITY_SCORE: 20
WALKING_OR_CLIMBING_STAIRS_DIFFICULTY: NO
DRESSING/BATHING_DIFFICULTY: NO
ADLS_ACUITY_SCORE: 20
ADLS_ACUITY_SCORE: 20
FALL_HISTORY_WITHIN_LAST_SIX_MONTHS: NO
WEAR_GLASSES_OR_BLIND: YES
CONCENTRATING,_REMEMBERING_OR_MAKING_DECISIONS_DIFFICULTY: NO
DIFFICULTY_EATING/SWALLOWING: NO
ADLS_ACUITY_SCORE: 20
ADLS_ACUITY_SCORE: 20
DOING_ERRANDS_INDEPENDENTLY_DIFFICULTY: NO
DIFFICULTY_COMMUNICATING: NO
TOILETING_ISSUES: NO
ADLS_ACUITY_SCORE: 20

## 2022-09-26 NOTE — PROVIDER NOTIFICATION
09/26/22 0924   Provider Notification   Provider Name/Title [facility]   Method of Notification At Bedside   Request Evaluate in Person   Notification Reason Status Update;SVE   POC reviewed with pt and spouse. Plan to start pitocin, get epidural once some labor starts prior to AROM per pt request. Pt consents to starting pitocin.

## 2022-09-26 NOTE — PROVIDER NOTIFICATION
09/26/22 1705   Provider Notification   Provider Name/Title Dr Oreilly   Method of Notification In Department   Request Evaluate-Remote   Notification Reason Status Update   Post delivery rubra scant to light flow but occasional clots still present. Did not weigh initially but has reoccurred x 2, no free flow. Per Dr Stafford give both methergine and rectal cytotec

## 2022-09-26 NOTE — PROVIDER NOTIFICATION
09/26/22 1255   Comments   Comments AROM procedure explained.Pt agrees to proceed. SVE 7/75/-1 station. AROM clear fluid. Repositioned on left side.

## 2022-09-26 NOTE — DISCHARGE SUMMARY
[facility] Discharge Summary    Ruth Persaud MRN# [id number]   Age: 32 year old YOB: 1990     Date of Admission:  2022  Date of Discharge::  22  Admitting Physician:  Kellen Stafford MD  Discharge Physician:  Nate Wilkerson MD     Home clinic: [facility]          Admission Diagnoses:   Indication for care in labor or delivery [O75.9]  A1 GDM   contractions  Hx  birth x2          Discharge Diagnosis:     Normal spontaneous vaginal delivery  Intrauterine pregnancy at 39 weeks gestation  12 second shoulder dystocia          Procedures:     Procedure(s): Shoulder dystocia reduction         No other procedures performed during this admission           Medications Prior to Admission:     Medications Prior to Admission   Medication Sig Dispense Refill Last Dose    Ascorbic Acid (VITAMIN C) 100 MG CHEW    2022 at Unknown time    citalopram (CELEXA) 10 MG tablet Take 10 mg by mouth daily   Past Month at Unknown time    Prenatal Vit-Fe Fumarate-FA (PRENATAL MULTIVITAMIN W/IRON) 27-0.8 MG tablet Take 1 tablet by mouth daily   2022 at Unknown time    vitamin D3 (CHOLECALCIFEROL) 1.25 MG (60851 UT) capsule Take 50,000 Units by mouth every 7 days   2022 at Unknown time             Discharge Medications:     Current Discharge Medication List        START taking these medications    Details   benzocaine (AMERICAINE) 20 % external aerosol Apply to perineum four times daily as needed for pain  Qty: 57 g, Refills: 0    Associated Diagnoses: Indication for care in labor or delivery      docusate sodium (COLACE) 100 MG capsule Take 1 capsule (100 mg) by mouth daily  Qty: 30 capsule, Refills: 0    Associated Diagnoses: Indication for care in labor or delivery      hydrocortisone, Perianal, (ANUSOL-HC) 2.5 % cream Place rectally 3 times daily as needed for hemorrhoids  Qty: 30 g, Refills: 0    Associated Diagnoses: Indication for care in labor  or delivery      ibuprofen (ADVIL/MOTRIN) 800 MG tablet Take 1 tablet (800 mg) by mouth every 6 hours as needed for other (cramping)  Qty: 40 tablet, Refills: 1    Associated Diagnoses: Indication for care in labor or delivery      lanolin ointment Apply topically every hour as needed for other (sore nipples)  Qty: 7 g, Refills: 3    Associated Diagnoses: Indication for care in labor or delivery           CONTINUE these medications which have NOT CHANGED    Details   Ascorbic Acid (VITAMIN C) 100 MG CHEW       citalopram (CELEXA) 10 MG tablet Take 10 mg by mouth daily      Prenatal Vit-Fe Fumarate-FA (PRENATAL MULTIVITAMIN W/IRON) 27-0.8 MG tablet Take 1 tablet by mouth daily      vitamin D3 (CHOLECALCIFEROL) 1.25 MG (25765 UT) capsule Take 50,000 Units by mouth every 7 days                   Consultations:   No consultations were requested during this admission          Brief History of Labor:   Ruth Persaud is a 32 year old  who was admitted at 0730 22. She presented for induction of labor. She was noted to be 4 cm dilated. This pregnancy was complicated by A1 GDM,  contractions, hx  birth x2. GBS negative, Rh positive, Covid negative. She was admitted to Labor and Delivery. Pitocin was begun per protocol. Labor progressed, and epidural was administered. Rupture of membranes was artificial, and clear fluid was noted. She progressed to complete. She pushed effectively, for approximately 5 minutes. At 1500, she experienced  of a vigorous female , from an KEVON position, over an intact perineum. Repair was not needed. There was a 12 second shoulder dystocia, resolved with McRobert's, suprapubic pressure, and delivery of the posterior arm.  A nuchal cord was reduced at the perineum. APGARS 8/9. Pitocin was begun after delivery of the baby. The cord was cut at  seconds. A three vessel cord was noted. The placenta delivered spontaneously, and found to appear intact on inspection.  QBL 462mL           Hospital Course:   The patient's hospital course was unremarkable.  On discharge, her pain was well controlled. Vaginal bleeding is similar to peak menstrual flow.  Voiding without difficulty.  Ambulating well and tolerating a normal diet.  No fever.  Breastfeeding well.  Infant is stable.  No bowel movement yet.*  She was discharged on post-partum day #1.    Planning POP for BC to be Rx at 6 wk pp visit.    Post-partum hemoglobin:   Hemoglobin   Date Value Ref Range Status   09/26/2022 13.6 11.7 - 15.7 g/dL Final   01/28/2013 15.1 11.7 - 15.7 g/dL Final             Discharge Instructions and Follow-Up:     Discharge diet: Regular   Discharge activity: No driving or operating machinery while on narcotic analgesics  Pelvic rest: abstain from intercourse and do not use tampons for 6 week(s)   Discharge follow-up: Follow up with primary care provider in 6 weeks   Wound care: Drink plenty of fluids  Ice to area for comfort           Discharge Disposition:     Discharged to home      Nate Wilkerson MD

## 2022-09-26 NOTE — PLAN OF CARE
here for induction of labor @ 391/7 for GDMA1. Pt reports good fetal movement. Denies any S&S of preE. Denies any UC, LOF or VB.

## 2022-09-26 NOTE — H&P
"  2022    Ruth Suresh  [id number]          OB Admit History & Physical      Ms. Suresh  is here for induction of labor for A1 GDM.    No LMP recorded. Patient is pregnant.   Her Estimated Date of Delivery: Oct 2, 2022  , making her 39w1d  wks.      Estimated body mass index is 29.58 kg/m  as calculated from the following:    Height as of 22: 1.6 m (5' 3\").    Weight as of 22: 75.8 kg (167 lb).  Her prenatal course has been complicated by  contractions, with history of  labor/ birth in prior pregnancies, A1 GDM..    See prenatal for labs.  neg GBBS, Rubella Immune, RH AB+    Estimated fetal weight= 3300gm       She is a 32 year old   Her OB history:   OB History    Para Term  AB Living   7 3 1 2 3 3   SAB IAB Ectopic Multiple Live Births   2 1 0 0 3      # Outcome Date GA Lbr Marvel/2nd Weight Sex Delivery Anes PTL Lv   7 Current            6 SAB 21           5  16 36w4d  3.21 kg (7 lb 1.2 oz) M Vag-Spont  Y BREANA      Name: MARIS SURESH      Apgar1: 9  Apgar5: 9   4  12 35w0d  2.485 kg (5 lb 7.7 oz) M Vag-Spont  Y BREANA      Name: Alexandria      Apgar1: 9  Apgar5: 9   3 SAB 2011 6w0d          2 Term 07 38w3d 12:00 2.92 kg (6 lb 7 oz) M Vag-Spont EPI Y BREANA      Name: Zenaida      Apgar1: 8  Apgar5: 9   1 IAB 10/2006 8w0d                   Past Medical History:   Diagnosis Date     Diabetes (H)      ABDULAZIZ (generalized anxiety disorder)           Past Surgical History:   Procedure Laterality Date     WISDOM TOOTH EXTRACTION           No current outpatient medications on file.       Allergies: Patient has no known allergies.      REVIEW OF SYSTEMS:  NEUROLOGIC:  Negative  EYES:  Negative  ENT:  Negative  GI:  Negative  BREAST:  Negative  :  Negative  GYN:  Negative  CV:  Negative  PULMONARY:  Negative  MUSCULOSKELETAL:  Negative  PSYCH:  Negative        Social History     Socioeconomic History     Marital status:      Spouse " name: Not on file     Number of children: Not on file     Years of education: Not on file     Highest education level: Not on file   Occupational History     Not on file   Tobacco Use     Smoking status: Never Smoker     Smokeless tobacco: Never Used   Substance and Sexual Activity     Alcohol use: Never     Drug use: Never     Sexual activity: Yes     Partners: Male   Other Topics Concern     Not on file   Social History Narrative     Not on file     Social Determinants of Health     Financial Resource Strain: Not on file   Food Insecurity: Not on file   Transportation Needs: Not on file   Physical Activity: Not on file   Stress: Not on file   Social Connections: Not on file   Intimate Partner Violence: Not on file   Housing Stability: Not on file      History reviewed. No pertinent family history.      Vitals:   FHT category 1    Alert Awake in NAD  HEENT grossly normal  Neck: no lymphadenopathy or thryoidomegaly  Lungs CTAB  Back no spinal or CVAT  Heart RRR  ABD gravid, nontender on exam with vertex palpable  Pelvic:  no fluid noted, no blood noted  Cervix is 4 cm / 50 % effaced at -2 station  EXT:  no edema or calf tenderness  Neuro:  Grossly intact    Assessment/Plan:  31y/o  with SIUP 39w1, here for IOL due to A1 GDM    Induction of labor  -GBS neg/Rh pos/covid neg  -pitocin per protocol  -AROM (clear 1300 22)    A1 GDM  -well controlled    PTC  - contractions  -received bmtz, plus rescue course    Hx PTL/PTB    Kellen Stafford MD  Dept of OB/GYN  2022

## 2022-09-26 NOTE — PROGRESS NOTES
LABOR NOTE    Subjective: Reports comfort with epidural. AROM of copious light meconium fluid. Well tolerated. Pitocin at 4mU/min.    Objective:  /65   Pulse 98   Temp 97.8  F (36.6  C) (Oral)   Resp 16    FHT: cat 1  [place]: q 3 min  SVE: 7-8/75/-1 ant, soft    Assessment and Plan:  32 year old  female at 39w1d, here for IOL for A1 GDM, hx PTL/PTB.    PTC  -received two courses of betamethasone this pregnancy, last -    A1 GDM  -last EFW 46%    Kellen Stafford MD

## 2022-09-26 NOTE — L&D DELIVERY NOTE
Ruth Persaud is a 32 year old  who was admitted at 0730 22. She presented for induction of labor. She was noted to be 4 cm dilated. This pregnancy was complicated by A1 GDM,  contractions, hx  birth x2. GBS negative, Rh positive, Covid negative. She was admitted to Labor and Delivery. Pitocin was begun per protocol. Labor progressed, and epidural was administered. Rupture of membranes was artificial, and clear fluid was noted. She progressed to complete. She pushed effectively, for approximately 5 minutes. At 1500, she experienced  of a vigorous female , from an KEVON position, over an intact perineum. Repair was not needed. There was a 12 second shoulder dystocia, resolved with McRobert's, suprapubic pressure, and delivery of the posterior arm.  A nuchal cord was reduced at the perineum. APGARS 8/9. Pitocin was begun after delivery of the baby. The cord was cut at  seconds. A three vessel cord was noted. The placenta delivered spontaneously, and found to appear intact on inspection. QBL 462mL    Kellen Stafford MD on 2022 at 3:37 PM  .

## 2022-09-27 VITALS
TEMPERATURE: 98 F | RESPIRATION RATE: 16 BRPM | HEART RATE: 77 BPM | DIASTOLIC BLOOD PRESSURE: 57 MMHG | SYSTOLIC BLOOD PRESSURE: 112 MMHG

## 2022-09-27 LAB
GLUCOSE BLDC GLUCOMTR-MCNC: 70 MG/DL (ref 70–99)
HGB BLD-MCNC: 12.8 G/DL (ref 11.7–15.7)

## 2022-09-27 PROCEDURE — 36415 COLL VENOUS BLD VENIPUNCTURE: CPT | Performed by: OBSTETRICS & GYNECOLOGY

## 2022-09-27 PROCEDURE — 85018 HEMOGLOBIN: CPT | Performed by: OBSTETRICS & GYNECOLOGY

## 2022-09-27 PROCEDURE — 250N000013 HC RX MED GY IP 250 OP 250 PS 637: Performed by: OBSTETRICS & GYNECOLOGY

## 2022-09-27 RX ADMIN — IBUPROFEN 800 MG: 800 TABLET, FILM COATED ORAL at 12:22

## 2022-09-27 RX ADMIN — ACETAMINOPHEN 650 MG: 325 TABLET, FILM COATED ORAL at 06:26

## 2022-09-27 RX ADMIN — DOCUSATE SODIUM 100 MG: 100 CAPSULE, LIQUID FILLED ORAL at 09:38

## 2022-09-27 RX ADMIN — ACETAMINOPHEN 650 MG: 325 TABLET, FILM COATED ORAL at 15:47

## 2022-09-27 RX ADMIN — ACETAMINOPHEN 650 MG: 325 TABLET, FILM COATED ORAL at 01:47

## 2022-09-27 RX ADMIN — IBUPROFEN 800 MG: 800 TABLET, FILM COATED ORAL at 06:26

## 2022-09-27 ASSESSMENT — ACTIVITIES OF DAILY LIVING (ADL)
ADLS_ACUITY_SCORE: 20

## 2022-09-27 NOTE — LACTATION NOTE
This note was copied from a baby's chart.  Lactation review of chart. MOB is breast/bottle feeding, has exclusively bottle fed this shift. Lactation visit offered to MOB, she declines per bedside RN. She is aware lactation is available if any needs arise.

## 2022-09-27 NOTE — PROGRESS NOTES
Patient Name:  Ruth Persaud   MRN:  [id number]  Age:  32 year old    YOB: 1990      POSTPARTUM PROGRESS NOTE    Pt is PPD#1 s/p vaginal delivery.  She is doing well without complaints.  Pt is ambulating, voiding, tolerating a regular diet.  Pain is well controlled and lochia is within normal limits.  She is breastfeeding.  Baby is doing well.    Objective:    Temp:  [97.7  F (36.5  C)-98.9  F (37.2  C)] 98.5  F (36.9  C)  Resp:  [16] 16  BP: (100-126)/(51-72) 110/63  0 lbs 0 oz    General Appearance:  NAD  Abdomen:  nontender, nondistended  Fundus:  firm, below the umbilicus      Lower extremities:  no significant edema    Lab Review:    ABO/RH(D)   Date Value Ref Range Status   09/26/2022 AB POS  Final     Hemoglobin   Date Value Ref Range Status   09/26/2022 13.6 11.7 - 15.7 g/dL Final   08/20/2022 12.4 11.7 - 15.7 g/dL Final   08/01/2022 12.7 11.7 - 15.7 g/dL Final   01/28/2013 15.1 11.7 - 15.7 g/dL Final   04/23/2011 14.0 11.7 - 15.7 g/dL Final   04/23/2011 14.3 11.7 - 15.7 g/dL Final     Hematocrit   Date Value Ref Range Status   08/01/2022 39.2 35.0 - 47.0 % Final   03/02/2022 38.1 35.0 - 47.0 % Final   07/30/2021 40.4 35.0 - 47.0 % Final   01/28/2013 42.8 35.0 - 47.0 % Final   04/23/2011 39.8 35.0 - 47.0 % Final   04/23/2011 40.9 35.0 - 47.0 % Final       Lab Results   Component Value Date    WBC 9.5 08/01/2022    WBC 5.6 01/28/2013     Lab Results   Component Value Date    RBC 4.12 08/01/2022    RBC 4.81 01/28/2013     Lab Results   Component Value Date    HGB 13.6 09/26/2022    HGB 15.1 01/28/2013     Lab Results   Component Value Date    HCT 39.2 08/01/2022    HCT 42.8 01/28/2013     No components found for: MCT  Lab Results   Component Value Date    MCV 95 08/01/2022    MCV 89 01/28/2013     Lab Results   Component Value Date    MCH 30.8 08/01/2022    MCH 31.4 01/28/2013     Lab Results   Component Value Date    MCHC 32.4 08/01/2022    MCHC 35.3 01/28/2013     Lab Results   Component Value  Date    RDW 13.8 08/01/2022    RDW 12.2 01/28/2013     Lab Results   Component Value Date     08/01/2022     01/28/2013       Assessment:  PPD#1 s/p vaginal delivery with brief shoulder dystocia, doing well.    Plan:   - Postpartum: recovering well. Pain well controlled. Cont PO pain meds and regular diet. Encourage ambulation.  - S/p shoulder dystocia (12 seconds)   + Bleeding w/o PPH   + s/p meth x1 and cytotec   + Hgb 13.6 > 462mL EBL > AM Hgb 12.8  - gDMA1   + AM fasting glucose 70   + 2hr GTT 6-12 weeks pp  - Contraception: POP  - Dispo: anticipate DC PPD#1, pt desires early discharge.

## 2022-09-27 NOTE — PLAN OF CARE
Data: Ruth Persaud transferred to 443 via wheelchair at 1830. Baby transferred via parent's arms.  Action: Receiving unit notified of transfer: Yes. Patient and family notified of room change. Report given to Oleg at 1910. Belongings sent to receiving unit. Accompanied by Registered Nurse. Oriented patient to surroundings. Call light within reach. ID bands double-checked with receiving RN.  Response: Patient tolerated transfer and is stable.    Pt voided before transfer, scant flow/no clots. Amble to ambulate with standby assist, pericare done.

## 2022-09-27 NOTE — DISCHARGE INSTRUCTIONS
"Postpartum Discharge Instructions  ACTIVITY:  - You may ride in a car, but no driving for 1-2 weeks.  - Do not lift anything heavier than your baby for 6 weeks.  - You may slowly go up and down stairs as you feel able.  - Resume other exercises after 6 weeks.  - Rest when your baby is sleeping.  - Call your doctor if you are feeling \"blue\" for more than 2 weeks.  - Call your doctor immediately or go the Emergency Center if you think you might hurt yourself or your baby.  HYGIENE:  - You may take a tub bath or shower.  - Continue using a alaina bottle or sitz bath for comfort or cleanliness.  - No douching or tampon use until after 6 week checkup.  DIET:  - Wait 6 weeks before dieting to lose weight.  - Aim for gradual weight loss through healthy eating habits.  - Take a vitamin daily unless otherwise directed.  BREASTFEEDING:  - Refer to Breastfeeding Guidelines booklet or call Breastfeeding support [place]: [phone]  MEDICATIONS:  - Use as directed on prescription.  PAIN MANAGEMENT:    Breast Care:  - If not breastfeeding, apply ice packs to your breasts 3 times per day for 15 minutes.  Wear a tight bra for at least one week.  - If breastfeeding, nurse often to get relief, pain medication as directed.  IF Laceration:  - Continue use of alaina bottle and sitz bath as directed.  - Use Dermoplast and/or Tucks as directed.  IF  Incision:  - Splint incision when moving and turning.  - Medications as instructed.  SPECIAL INFORMATION:  - No sexual intercourse for 6 weeks.  After that, use a barrier contraception until your doctor tells you it is ok to use something different.     - Breastfeeding is not a method of birth control.  - You may have a period while breastfeeding.  Your first period may come 4 to 10 weeks after delivery, or later if you are breastfeeding.  - Avoid constipation.  Drink plenty of water, eat vegetables and fruits high in natural fiber, high grain breads and cereals.  You may use a stool " softener as necessary.  COMPLICATIONS:  Call you doctor if any of the following occur:  - Continuing bright red vaginal bleeding or clots larger than a lemon.  - Pain or redness in the breasts.  - Fever over 100.4 when temperature is taken by mouth.  - Burning feeling with urination.  - Bad smelling vaginal drainage.  - Incision or episiotomy pulls apart, is red or has drainage.    Postpartum Vaginal Delivery Instructions    Activity     Ask family and friends for help when you need it.  Do not place anything in your vagina for 6 weeks.  You are not restricted on other activities, but take it easy for a few weeks to allow your body to recover from delivery.  You are able to do any activities you feel up to that point.  No driving until you have stopped taking your pain medications (usually two weeks after delivery).     Call your health care provider if you have any of these symptoms:     Increased pain, swelling, redness, or fluid around your stiches from an episiotomy or perineal tear.  A fever above 100.4 F (38 C) with or without chills when placing a thermometer under your tongue.  You soak a sanitary pad with blood within 1 hour, or you see blood clots larger than a golf ball.  Bleeding that lasts more than 6 weeks.  Vaginal discharge that smells bad.  Severe pain, cramping or tenderness in your lower belly area.  A need to urinate more frequently (use the toilet more often), more urgently (use the toilet very quickly), or it burns when you urinate.  Nausea and vomiting.  Redness, swelling or pain around a vein in your leg.  Problems breastfeeding or a red or painful area on your breast.  Chest pain and cough or are gasping for air.  Problems coping with sadness, anxiety, or depression.  If you have any concerns about hurting yourself or the baby, call your provider immediately.   You have questions or concerns after you return home.     Keep your hands clean:  Always wash your hands before touching your  perineal area and stitches.  This helps reduce your risk of infection.  If your hands aren't dirty, you may use an alcohol hand-rub to clean your hands. Keep your nails clean and short.

## 2022-09-27 NOTE — PLAN OF CARE
Vitals stable. Independent with self and infant cares. Pain controlled with Ibuprofen and Tylenol with good relief. Fasting blood sugar 70. Bonding well with baby.

## 2022-09-27 NOTE — ANESTHESIA POSTPROCEDURE EVALUATION
Patient: Ruth Persaud    Procedure: * No procedures listed *       Anesthesia Type:  No value filed.    Note:     Postop Pain Control:    PONV:    Neuro/Psych:    Airway/Respiratory:    CV/Hemodynamics:    Other NRE:    DID A NON-ROUTINE EVENT OCCUR?     Event details/Postop Comments:      S/P epidural for labor.   I or my partner was immediately available for management of this patient during epidural analgesia infusion.  VSS.  Doing well. Block resolved.  Neuro at baseline. Denies positional headache. Minimal side effects easily managed w/ PRN meds. No apparent anesthetic complications. No follow-up required.    Nitish Guerin MD           Last vitals:  Vitals:    09/26/22 1803 09/26/22 2216 09/27/22 0148   BP: 125/67  110/63   Pulse:      Resp:  16 16   Temp:  98.4  F (36.9  C) 98.5  F (36.9  C)       Electronically Signed By: Nitish Guerin MD  September 27, 2022  6:38 AM

## 2022-09-27 NOTE — PLAN OF CARE
VSS and Postpartum checks WNL - see flow record. Patient eating and drinking normally. Patient able to empty bladder independently and is up ambulating. Patient performing self cares and is able to care for infant.  Patient is breast and bottle feeding (Q. 2-3 hrs). Pain well controlled with ibuprofen and tylenol. Postive attachment behaviors observed between infant and both parents. Patient's spouse present, supportive, and attentive. Continue current plan of care.

## 2022-09-27 NOTE — PLAN OF CARE
VSS and WNL. Postpartum checks WNL -- see flow record. Patient eating and drinking normally. Patient able to empty bladder independently and is up ambulating. Patient performing self cares and is able to care for infant. Positive attachment behaviors observed with infant. Discharge instructions completed.  Patient states she understands all discharge instructions and all her questions have been answered.  Verbalizes when she needs to return to clinic for follow up for herself and baby. Prescriptions reviewed and sent home with patient.  Postpartum depression symptoms reviewed and encouraged frequent review of depression scale. Patient discharged to home with infant and spouse @ 1820.

## 2022-09-28 NOTE — ANESTHESIA PROCEDURE NOTES
Epidural catheter Procedure Note    Pre-Procedure   Staff -        Performed By: Anesthesiologist  Procedure Documentation  Procedure: epidural catheter   Comments:  Pre-Procedure  Performed by Tarik Armstrong MD  Location: OB.      PreAnesthestic Checklist: patient identified, IV checked, risks and benefits discussed, informed consent obtained, monitors and equipment checked, pre-op evaluation and at physician/surgeon's request.    Timeout   Correct Patient: Yes  Correct Procedure: Epidural catheter placement  Correct Site: Yes   Correct Position: Yes    Procedure Documentation  Procedure:   Epidural catheter block for Labor    Patient currently in labor and she and OBMD request a labor epidural to control her labor pains. Patient was interviewed and examined. Procedure and risks including but not limited to bleeding, infection, nerve injury, paralysis, PDPH, and inadequate block requiring intervention discussed with patient. Questions answered. This epidural is to be placed in anticipation of vaginal delivery.  She consents to the epidural procedure.  Time-out was performed.  I or my partners remain immediately available for management of any issues or complications and will monitor at appropriate intervals.  Procedure: Patient sitting. Betadine prep x 3. Sterile drape applied.  Mask and gloves used.  Lidocaine 1%  local infiltration at L 3-4.  17 G. Tuohy needle at L3-4 by loss of resistance into epidural space.  No CSF, paresthesia or blood. 1.5 % Lidocaine with 1:200,000 Epinephrine 5cc test dose. Epidural catheter inserted w/o resistance to 5 cm in epidural space. Then 0.125% bupivicaine with fentanyl 2 mcg/ml 12 cc with NS 5 cc.  Aspiration negative for blood and CSF.   Negative for neuro change, paresthesia or symptoms of intravascular injection or intrathecal injection.  Infusion orders written and infusion of 0.125% bupivicaine with fentanyl 2 mcg/ml at 10cc per hour started.    Tarik Armstrong MD

## 2022-09-28 NOTE — ANESTHESIA PREPROCEDURE EVALUATION
Anesthesia Pre-Procedure Evaluation    Patient: Ruth Persaud   MRN: [id number] : 1990        Procedure : * No procedures listed *          Past Medical History:   Diagnosis Date     Diabetes (H)      ABDULAZIZ (generalized anxiety disorder)       Past Surgical History:   Procedure Laterality Date     WISDOM TOOTH EXTRACTION        No Known Allergies   Social History     Tobacco Use     Smoking status: Never Smoker     Smokeless tobacco: Never Used   Substance Use Topics     Alcohol use: Never      Wt Readings from Last 1 Encounters:   22 75.8 kg (167 lb)        Anesthesia Evaluation            ROS/MED HX  ENT/Pulmonary:  - neg pulmonary ROS     Neurologic:       Cardiovascular:  - neg cardiovascular ROS     METS/Exercise Tolerance:     Hematologic:       Musculoskeletal:       GI/Hepatic:       Renal/Genitourinary:       Endo:     (+) gestational diabetes and Diet- controlled,    Psychiatric/Substance Use:       Infectious Disease:       Malignancy:       Other:            Physical Exam    Airway        Mallampati: III   TM distance: > 3 FB   Neck ROM: full     Respiratory Devices and Support         Dental           Cardiovascular             Pulmonary                   OUTSIDE LABS:  CBC:   Lab Results   Component Value Date    WBC 9.5 2022    WBC 15.0 (H) 2022    HGB 12.8 2022    HGB 13.6 2022    HCT 39.2 2022    HCT 38.1 2022     2022     2022     BMP:   Lab Results   Component Value Date     2022     2021    POTASSIUM 3.5 2022    POTASSIUM 4.0 2021    CHLORIDE 106 2022    CHLORIDE 109 2021    CO2 27 2022    CO2 28 2021    BUN 6 (L) 2022    BUN 9 2021    CR 0.55 2022    CR 0.68 2021    GLC 70 2022     (H) 2022     COAGS: No results found for: PTT, INR, FIBR  POC:   Lab Results   Component Value Date    HCGS Negative 2013     HEPATIC: No  results found for: ALBUMIN, PROTTOTAL, ALT, AST, GGT, ALKPHOS, BILITOTAL, BILIDIRECT, LUIS FERNANDO  OTHER:   Lab Results   Component Value Date    ALLY 8.7 03/02/2022       Anesthesia Plan    ASA Status:  2      Anesthesia Type: Epidural.              Consents    Anesthesia Plan(s) and associated risks, benefits, and realistic alternatives discussed. Questions answered and patient/representative(s) expressed understanding.    - Discussed:     - Discussed with:  Patient         Postoperative Care            Comments:           neg OB ROS.       Tarik Armstrong MD

## 2023-01-14 ENCOUNTER — HEALTH MAINTENANCE LETTER (OUTPATIENT)
Age: 33
End: 2023-01-14

## 2024-02-17 ENCOUNTER — HEALTH MAINTENANCE LETTER (OUTPATIENT)
Age: 34
End: 2024-02-17

## 2025-03-08 ENCOUNTER — HEALTH MAINTENANCE LETTER (OUTPATIENT)
Age: 35
End: 2025-03-08